# Patient Record
Sex: FEMALE | Race: BLACK OR AFRICAN AMERICAN | Employment: UNEMPLOYED | URBAN - METROPOLITAN AREA
[De-identification: names, ages, dates, MRNs, and addresses within clinical notes are randomized per-mention and may not be internally consistent; named-entity substitution may affect disease eponyms.]

---

## 2017-04-13 ENCOUNTER — PRENATAL OFFICE VISIT (OUTPATIENT)
Dept: NURSING | Facility: CLINIC | Age: 32
End: 2017-04-13
Payer: COMMERCIAL

## 2017-04-13 DIAGNOSIS — Z34.80 SUPERVISION OF OTHER NORMAL PREGNANCY, ANTEPARTUM: Primary | ICD-10-CM

## 2017-04-13 LAB
ABO + RH BLD: NORMAL
ABO + RH BLD: NORMAL
BETA HCG QUAL IFA URINE: POSITIVE
BLD GP AB SCN SERPL QL: NORMAL
BLOOD BANK CMNT PATIENT-IMP: NORMAL
ERYTHROCYTE [DISTWIDTH] IN BLOOD BY AUTOMATED COUNT: 15.4 % (ref 10–15)
HCT VFR BLD AUTO: 34.2 % (ref 35–47)
HGB BLD-MCNC: 11.5 G/DL (ref 11.7–15.7)
MCH RBC QN AUTO: 28.8 PG (ref 26.5–33)
MCHC RBC AUTO-ENTMCNC: 33.6 G/DL (ref 31.5–36.5)
MCV RBC AUTO: 86 FL (ref 78–100)
PLATELET # BLD AUTO: 152 10E9/L (ref 150–450)
RBC # BLD AUTO: 3.99 10E12/L (ref 3.8–5.2)
SPECIMEN EXP DATE BLD: NORMAL
WBC # BLD AUTO: 5.7 10E9/L (ref 4–11)

## 2017-04-13 PROCEDURE — 36415 COLL VENOUS BLD VENIPUNCTURE: CPT | Performed by: OBSTETRICS & GYNECOLOGY

## 2017-04-13 PROCEDURE — 86780 TREPONEMA PALLIDUM: CPT | Performed by: OBSTETRICS & GYNECOLOGY

## 2017-04-13 PROCEDURE — 87389 HIV-1 AG W/HIV-1&-2 AB AG IA: CPT | Performed by: OBSTETRICS & GYNECOLOGY

## 2017-04-13 PROCEDURE — 86762 RUBELLA ANTIBODY: CPT | Performed by: OBSTETRICS & GYNECOLOGY

## 2017-04-13 PROCEDURE — 86850 RBC ANTIBODY SCREEN: CPT | Performed by: OBSTETRICS & GYNECOLOGY

## 2017-04-13 PROCEDURE — 86901 BLOOD TYPING SEROLOGIC RH(D): CPT | Performed by: OBSTETRICS & GYNECOLOGY

## 2017-04-13 PROCEDURE — 86900 BLOOD TYPING SEROLOGIC ABO: CPT | Performed by: OBSTETRICS & GYNECOLOGY

## 2017-04-13 PROCEDURE — 85027 COMPLETE CBC AUTOMATED: CPT | Performed by: OBSTETRICS & GYNECOLOGY

## 2017-04-13 PROCEDURE — 87340 HEPATITIS B SURFACE AG IA: CPT | Performed by: OBSTETRICS & GYNECOLOGY

## 2017-04-13 PROCEDURE — 99207 ZZC NO CHARGE NURSE ONLY: CPT

## 2017-04-13 PROCEDURE — 84703 CHORIONIC GONADOTROPIN ASSAY: CPT | Performed by: OBSTETRICS & GYNECOLOGY

## 2017-04-13 PROCEDURE — 87086 URINE CULTURE/COLONY COUNT: CPT | Performed by: OBSTETRICS & GYNECOLOGY

## 2017-04-13 NOTE — NURSING NOTE
NPN nurse visit. 1st dr visit scheduled for 5/17/17 with Tiffanie Francis M.D. Pap due. Last pap unsure.  Pt did not list LMP on her questionnaires. I did place an order for an US.   LMP is noted as 12/6/16 in epic.?  TOMAS Jiménez RN

## 2017-04-14 LAB
BACTERIA SPEC CULT: NO GROWTH
HBV SURFACE AG SERPL QL IA: NONREACTIVE
HIV 1+2 AB+HIV1 P24 AG SERPL QL IA: NORMAL
MICRO REPORT STATUS: NORMAL
RUBV IGG SERPL IA-ACNC: 30 IU/ML
SPECIMEN SOURCE: NORMAL
T PALLIDUM IGG+IGM SER QL: NEGATIVE

## 2017-04-26 ENCOUNTER — RADIANT APPOINTMENT (OUTPATIENT)
Dept: ULTRASOUND IMAGING | Facility: CLINIC | Age: 32
End: 2017-04-26
Attending: OBSTETRICS & GYNECOLOGY
Payer: COMMERCIAL

## 2017-04-26 DIAGNOSIS — Z34.80 SUPERVISION OF OTHER NORMAL PREGNANCY, ANTEPARTUM: ICD-10-CM

## 2017-04-26 PROCEDURE — 76801 OB US < 14 WKS SINGLE FETUS: CPT | Performed by: OBSTETRICS & GYNECOLOGY

## 2017-10-04 LAB — GROUP B STREP PCR: NORMAL

## 2017-11-10 RX ORDER — SODIUM CHLORIDE, SODIUM LACTATE, POTASSIUM CHLORIDE, CALCIUM CHLORIDE 600; 310; 30; 20 MG/100ML; MG/100ML; MG/100ML; MG/100ML
INJECTION, SOLUTION INTRAVENOUS CONTINUOUS
Status: CANCELLED | OUTPATIENT
Start: 2017-11-10

## 2017-11-10 RX ORDER — OXYTOCIN/0.9 % SODIUM CHLORIDE 30/500 ML
1-24 PLASTIC BAG, INJECTION (ML) INTRAVENOUS CONTINUOUS
Status: CANCELLED | OUTPATIENT
Start: 2017-11-10

## 2017-11-10 RX ORDER — TERBUTALINE SULFATE 1 MG/ML
0.25 INJECTION, SOLUTION SUBCUTANEOUS
Status: CANCELLED | OUTPATIENT
Start: 2017-11-10

## 2017-11-10 RX ORDER — LIDOCAINE 40 MG/G
CREAM TOPICAL
Status: CANCELLED | OUTPATIENT
Start: 2017-11-10

## 2017-11-14 ENCOUNTER — HOSPITAL ENCOUNTER (INPATIENT)
Facility: CLINIC | Age: 32
LOS: 2 days | Discharge: HOME HEALTH | End: 2017-11-16
Attending: REGISTERED NURSE | Admitting: REGISTERED NURSE
Payer: COMMERCIAL

## 2017-11-14 LAB
ABO + RH BLD: NORMAL
ABO + RH BLD: NORMAL
SPECIMEN EXP DATE BLD: NORMAL
T PALLIDUM IGG+IGM SER QL: NEGATIVE

## 2017-11-14 PROCEDURE — 86780 TREPONEMA PALLIDUM: CPT | Performed by: REGISTERED NURSE

## 2017-11-14 PROCEDURE — 86900 BLOOD TYPING SEROLOGIC ABO: CPT | Performed by: REGISTERED NURSE

## 2017-11-14 PROCEDURE — 36415 COLL VENOUS BLD VENIPUNCTURE: CPT | Performed by: REGISTERED NURSE

## 2017-11-14 PROCEDURE — 12000037 ZZH R&B POSTPARTUM INTERMEDIATE

## 2017-11-14 PROCEDURE — 25000125 ZZHC RX 250: Performed by: REGISTERED NURSE

## 2017-11-14 PROCEDURE — S0191 MISOPROSTOL, ORAL, 200 MCG: HCPCS | Performed by: REGISTERED NURSE

## 2017-11-14 PROCEDURE — 72200001 ZZH LABOR CARE VAGINAL DELIVERY SINGLE

## 2017-11-14 PROCEDURE — 25000128 H RX IP 250 OP 636: Performed by: REGISTERED NURSE

## 2017-11-14 PROCEDURE — 25000132 ZZH RX MED GY IP 250 OP 250 PS 637: Performed by: REGISTERED NURSE

## 2017-11-14 PROCEDURE — 10907ZC DRAINAGE OF AMNIOTIC FLUID, THERAPEUTIC FROM PRODUCTS OF CONCEPTION, VIA NATURAL OR ARTIFICIAL OPENING: ICD-10-PCS | Performed by: REGISTERED NURSE

## 2017-11-14 PROCEDURE — 86901 BLOOD TYPING SEROLOGIC RH(D): CPT | Performed by: REGISTERED NURSE

## 2017-11-14 PROCEDURE — 3E033VJ INTRODUCTION OF OTHER HORMONE INTO PERIPHERAL VEIN, PERCUTANEOUS APPROACH: ICD-10-PCS | Performed by: REGISTERED NURSE

## 2017-11-14 RX ORDER — OXYTOCIN/0.9 % SODIUM CHLORIDE 30/500 ML
340 PLASTIC BAG, INJECTION (ML) INTRAVENOUS CONTINUOUS PRN
Status: DISCONTINUED | OUTPATIENT
Start: 2017-11-14 | End: 2017-11-16 | Stop reason: HOSPADM

## 2017-11-14 RX ORDER — OXYTOCIN 10 [USP'U]/ML
10 INJECTION, SOLUTION INTRAMUSCULAR; INTRAVENOUS
Status: DISCONTINUED | OUTPATIENT
Start: 2017-11-14 | End: 2017-11-16 | Stop reason: HOSPADM

## 2017-11-14 RX ORDER — ONDANSETRON 2 MG/ML
4 INJECTION INTRAMUSCULAR; INTRAVENOUS EVERY 6 HOURS PRN
Status: DISCONTINUED | OUTPATIENT
Start: 2017-11-14 | End: 2017-11-14

## 2017-11-14 RX ORDER — NALOXONE HYDROCHLORIDE 0.4 MG/ML
.1-.4 INJECTION, SOLUTION INTRAMUSCULAR; INTRAVENOUS; SUBCUTANEOUS
Status: DISCONTINUED | OUTPATIENT
Start: 2017-11-14 | End: 2017-11-14

## 2017-11-14 RX ORDER — OXYTOCIN/0.9 % SODIUM CHLORIDE 30/500 ML
1-24 PLASTIC BAG, INJECTION (ML) INTRAVENOUS CONTINUOUS
Status: DISCONTINUED | OUTPATIENT
Start: 2017-11-14 | End: 2017-11-14

## 2017-11-14 RX ORDER — NALOXONE HYDROCHLORIDE 0.4 MG/ML
.1-.4 INJECTION, SOLUTION INTRAMUSCULAR; INTRAVENOUS; SUBCUTANEOUS
Status: DISCONTINUED | OUTPATIENT
Start: 2017-11-14 | End: 2017-11-16 | Stop reason: HOSPADM

## 2017-11-14 RX ORDER — ACETAMINOPHEN 325 MG/1
650 TABLET ORAL EVERY 4 HOURS PRN
Status: DISCONTINUED | OUTPATIENT
Start: 2017-11-14 | End: 2017-11-16 | Stop reason: HOSPADM

## 2017-11-14 RX ORDER — CARBOPROST TROMETHAMINE 250 UG/ML
250 INJECTION, SOLUTION INTRAMUSCULAR
Status: DISCONTINUED | OUTPATIENT
Start: 2017-11-14 | End: 2017-11-14

## 2017-11-14 RX ORDER — LANOLIN 100 %
OINTMENT (GRAM) TOPICAL
Status: DISCONTINUED | OUTPATIENT
Start: 2017-11-14 | End: 2017-11-16 | Stop reason: HOSPADM

## 2017-11-14 RX ORDER — LIDOCAINE 40 MG/G
CREAM TOPICAL
Status: DISCONTINUED | OUTPATIENT
Start: 2017-11-14 | End: 2017-11-14

## 2017-11-14 RX ORDER — AMOXICILLIN 250 MG
1-2 CAPSULE ORAL 2 TIMES DAILY
Status: DISCONTINUED | OUTPATIENT
Start: 2017-11-14 | End: 2017-11-16 | Stop reason: HOSPADM

## 2017-11-14 RX ORDER — FENTANYL CITRATE 50 UG/ML
50-100 INJECTION, SOLUTION INTRAMUSCULAR; INTRAVENOUS
Status: DISCONTINUED | OUTPATIENT
Start: 2017-11-14 | End: 2017-11-14

## 2017-11-14 RX ORDER — MISOPROSTOL 200 UG/1
800 TABLET ORAL ONCE
Status: COMPLETED | OUTPATIENT
Start: 2017-11-14 | End: 2017-11-14

## 2017-11-14 RX ORDER — OXYTOCIN/0.9 % SODIUM CHLORIDE 30/500 ML
100-340 PLASTIC BAG, INJECTION (ML) INTRAVENOUS CONTINUOUS PRN
Status: COMPLETED | OUTPATIENT
Start: 2017-11-14 | End: 2017-11-14

## 2017-11-14 RX ORDER — IBUPROFEN 400 MG/1
800 TABLET, FILM COATED ORAL
Status: COMPLETED | OUTPATIENT
Start: 2017-11-14 | End: 2017-11-14

## 2017-11-14 RX ORDER — BISACODYL 10 MG
10 SUPPOSITORY, RECTAL RECTAL DAILY PRN
Status: DISCONTINUED | OUTPATIENT
Start: 2017-11-16 | End: 2017-11-16 | Stop reason: HOSPADM

## 2017-11-14 RX ORDER — PENICILLIN G POTASSIUM 5000000 [IU]/1
2.5 INJECTION, POWDER, FOR SOLUTION INTRAMUSCULAR; INTRAVENOUS EVERY 4 HOURS
Status: DISCONTINUED | OUTPATIENT
Start: 2017-11-14 | End: 2017-11-14

## 2017-11-14 RX ORDER — OXYTOCIN/0.9 % SODIUM CHLORIDE 30/500 ML
100 PLASTIC BAG, INJECTION (ML) INTRAVENOUS CONTINUOUS
Status: DISCONTINUED | OUTPATIENT
Start: 2017-11-14 | End: 2017-11-16 | Stop reason: HOSPADM

## 2017-11-14 RX ORDER — OXYCODONE AND ACETAMINOPHEN 5; 325 MG/1; MG/1
1 TABLET ORAL
Status: DISCONTINUED | OUTPATIENT
Start: 2017-11-14 | End: 2017-11-14

## 2017-11-14 RX ORDER — PENICILLIN G POTASSIUM 5000000 [IU]/1
5 INJECTION, POWDER, FOR SOLUTION INTRAMUSCULAR; INTRAVENOUS ONCE
Status: COMPLETED | OUTPATIENT
Start: 2017-11-14 | End: 2017-11-14

## 2017-11-14 RX ORDER — SODIUM CHLORIDE, SODIUM LACTATE, POTASSIUM CHLORIDE, CALCIUM CHLORIDE 600; 310; 30; 20 MG/100ML; MG/100ML; MG/100ML; MG/100ML
INJECTION, SOLUTION INTRAVENOUS CONTINUOUS
Status: DISCONTINUED | OUTPATIENT
Start: 2017-11-14 | End: 2017-11-14

## 2017-11-14 RX ORDER — METHYLERGONOVINE MALEATE 0.2 MG/ML
200 INJECTION INTRAVENOUS
Status: DISCONTINUED | OUTPATIENT
Start: 2017-11-14 | End: 2017-11-14

## 2017-11-14 RX ORDER — MISOPROSTOL 200 UG/1
400 TABLET ORAL
Status: DISCONTINUED | OUTPATIENT
Start: 2017-11-14 | End: 2017-11-16 | Stop reason: HOSPADM

## 2017-11-14 RX ORDER — IBUPROFEN 400 MG/1
400-800 TABLET, FILM COATED ORAL EVERY 6 HOURS PRN
Status: DISCONTINUED | OUTPATIENT
Start: 2017-11-14 | End: 2017-11-16 | Stop reason: HOSPADM

## 2017-11-14 RX ORDER — HYDROCORTISONE 2.5 %
CREAM (GRAM) TOPICAL 3 TIMES DAILY PRN
Status: DISCONTINUED | OUTPATIENT
Start: 2017-11-14 | End: 2017-11-16 | Stop reason: HOSPADM

## 2017-11-14 RX ORDER — ACETAMINOPHEN 325 MG/1
650 TABLET ORAL EVERY 4 HOURS PRN
Status: DISCONTINUED | OUTPATIENT
Start: 2017-11-14 | End: 2017-11-14

## 2017-11-14 RX ORDER — PNV NO.95/FERROUS FUM/FOLIC AC 28MG-0.8MG
1 TABLET ORAL DAILY
Status: ON HOLD | COMMUNITY
Start: 2017-05-10 | End: 2021-08-13

## 2017-11-14 RX ORDER — OXYTOCIN 10 [USP'U]/ML
10 INJECTION, SOLUTION INTRAMUSCULAR; INTRAVENOUS
Status: DISCONTINUED | OUTPATIENT
Start: 2017-11-14 | End: 2017-11-14

## 2017-11-14 RX ADMIN — SODIUM CHLORIDE 2.5 MILLION UNITS: 9 INJECTION, SOLUTION INTRAVENOUS at 16:55

## 2017-11-14 RX ADMIN — SODIUM CHLORIDE, POTASSIUM CHLORIDE, SODIUM LACTATE AND CALCIUM CHLORIDE: 600; 310; 30; 20 INJECTION, SOLUTION INTRAVENOUS at 09:02

## 2017-11-14 RX ADMIN — IBUPROFEN 800 MG: 400 TABLET ORAL at 20:17

## 2017-11-14 RX ADMIN — PENICILLIN G POTASSIUM 5 MILLION UNITS: 5000000 POWDER, FOR SOLUTION INTRAMUSCULAR; INTRAPLEURAL; INTRATHECAL; INTRAVENOUS at 09:03

## 2017-11-14 RX ADMIN — Medication 100 ML/HR: at 20:15

## 2017-11-14 RX ADMIN — LIDOCAINE HYDROCHLORIDE 20 ML: 10 INJECTION, SOLUTION INFILTRATION; PERINEURAL at 19:50

## 2017-11-14 RX ADMIN — FENTANYL CITRATE 100 MCG: 50 INJECTION INTRAMUSCULAR; INTRAVENOUS at 17:01

## 2017-11-14 RX ADMIN — OXYTOCIN-SODIUM CHLORIDE 0.9% IV SOLN 30 UNIT/500ML 1 MILLI-UNITS/MIN: 30-0.9/5 SOLUTION at 11:23

## 2017-11-14 RX ADMIN — OXYTOCIN-SODIUM CHLORIDE 0.9% IV SOLN 30 UNIT/500ML 340 ML/HR: 30-0.9/5 SOLUTION at 19:45

## 2017-11-14 RX ADMIN — FENTANYL CITRATE 100 MCG: 50 INJECTION INTRAMUSCULAR; INTRAVENOUS at 18:17

## 2017-11-14 RX ADMIN — SODIUM CHLORIDE 2.5 MILLION UNITS: 9 INJECTION, SOLUTION INTRAVENOUS at 13:11

## 2017-11-14 RX ADMIN — ACETAMINOPHEN 650 MG: 325 TABLET, FILM COATED ORAL at 20:17

## 2017-11-14 RX ADMIN — MISOPROSTOL 800 MCG: 200 TABLET ORAL at 19:52

## 2017-11-14 NOTE — H&P
[facility] Labor and Delivery History and Physical    Daria Jaffe MRN# [id number]   Age: 32 year old YOB: 1985     Date of Admission:  2017    Primary care provider: No Ref-Primary, Physician           Chief Complaint:   Daria Jaffe is a 32 year old  who is 42w1d pregnant and being admitted for induction of labor, indication post-dates. Pt had a C/S with her second delivery. She has had two pitocin IOL since C/S with successful . Pregnancy course has been unremarkable.           Pregnancy history:     OBSTETRIC HISTORY:  x 1 with severe tearing in her first pregnancy. Decision was made to do primary elective C/S with next delivery because of this. With next provider Decision was made to attempt TOLAC. She has had 2  since C/S. Both were Pitocin IOL.   CURRENT PREGNANCY: GBS+, rubella non-immune. She has had severe anemia in the past but this pregnancy it has been mild. Postdates. US done at 13.5 weeks was consistent with LMP.     Obstetric History       T4      L6     SAB0   TAB0   Ectopic0   Multiple0   Live Births4       # Outcome Date GA Lbr Richard/2nd Weight Sex Delivery Anes PTL Lv   5 Current            4 Term 14 41w2d 03:08 / 00:04 3.935 kg (8 lb 10.8 oz) M  None N FRANKI      Name: Muba      Apgar1:  8                Apgar5: 9   3 Term 08/10/12 40w1d 05:20 / 00:08 3.629 kg (8 lb) M  Local N FRANKI      Name: Cecilia Med      Apgar1:  9                Apgar5: 9   2 Term 11 37w0d    -SEC   FRANKI      Name: Cyndee   1 Term 04/27/10 41w0d   F    FRANKI      Name: Barrington Basilio          EDC: Estimated Date of Delivery: Oct 31, 2017    Prenatal Labs:   Lab Results   Component Value Date    ABO O 2017    RH  Pos 2017    AS Neg 2017    HEPBANG Nonreactive 2017    CHPCRT negative 2014    TREPAB Negative 2017    RUBELLAABIGG immune 2014    HGB 11.5 (L) 2017    HIV neg 2012       GBS Status:    Lab Results   Component Value Date    GBS positive        Active Problem List  Patient Active Problem List   Diagnosis          Labor and delivery indication for care or intervention     Previous  delivery, antepartum condition or complication     Post-dates pregnancy         Previous  delivery, antepartum condition or complication     Partial Thyroidectomy. Stable labs.      Anemia very mild this pregnancy     Supervision of other normal pregnancy, antepartum       Medication Prior to Admission  No prescriptions prior to admission.   .        Maternal Past Medical History:     Past Medical History:   Diagnosis Date     Anemia     needed 2 units of blood for PPH due to severe tearing in first pregnancy.      Previous C/S     l     Thyroid disease     Partial thyroidectomy                          Social History:   I have reviewed this patient's social history          Physical Exam:   Vitals were reviewed  Blood pressure 115/81, temperature 97.5  F (36.4  C), temperature source Temporal, last menstrual period 2016, currently breastfeeding.  Constitutional:   awake, alert, cooperative, no apparent distress, and appears stated age        Cervix:   Membranes: AROM   Dilation: 2   Effacement: 70%   Station:-2   Consistency: soft   Position: Posterior  Presentation:Cephalic  Fetal Heart Rate Tracing: reactive and reassuring  Tocometer: Uterine irritability.                        Assessment:   Daria Jaffe is a 42w1d pregnant female admitted with induction of labor, indication post-dates.          Plan:   AROM performed by Dr. Garcia. Plan is to wait to see if labor starts without use of Pitocin. Will consider Pitocin at noon if there are no ctxs.   Admit - see IP orders  IVY Ham CNM

## 2017-11-14 NOTE — PROGRESS NOTES
Plan of care, TOLAC,  reviewed with Elisha Irvin CNM and I agree with plan. Pt examined. Cx 2/70/very soft/posterior/vtx -2 and well applied. AROM performed by me this check. Moderate amount of clear fluid. Pt kellen irregularly. FHR category 1.     Continue care per Elisha VANEGAS.   Pitocin augmentation prn  IV access in. Labs done  Either myself or Dr. Palmer will be immediately available today if needed

## 2017-11-14 NOTE — IP AVS SNAPSHOT
MRN:[id number]                      After Visit Summary   11/14/2017    Daria Jaffe    MRN: [id number]           Thank you!     Thank you for choosing [facility] for your care. Our goal is always to provide you with excellent care. Hearing back from our patients is one way we can continue to improve our services. Please take a few minutes to complete the written survey that you may receive in the mail after you visit with us. Thank you!        Patient Information     Date Of Birth          1985        About your hospital stay     You were admitted on:  November 14, 2017 You last received care in the:  [address]    You were discharged on:  November 16, 2017        Reason for your hospital stay       Maternity care                  Who to Call     For medical emergencies, please call 911.  For non-urgent questions about your medical care, please call your primary care provider or clinic, None          Attending Provider     Provider Specialty    Elisha Irvin APRN CNM [facility]       Primary Care Provider    Physician No Ref-Primary      After Care Instructions     Activity       Review discharge instructions            Diet       Resume previous diet            Discharge Instructions - Hypertensive disorders patients       High Blood pressure patients to follow up in clinic or via home care within one week for a blood pressure check            Discharge Instructions - Postpartum visit       Schedule postpartum visit with your provider and return to clinic in 6 weeks.                  Follow-up Appointments     Follow Up and recommended labs and tests       F/u in office in 6 weeks for PP visit.                  Further instructions from your care team       Postpartum Vaginal Delivery Instructions    Activity       Ask family and friends for help when you need it.    Do not place anything in your vagina for 6 weeks.    You are not restricted on other  activities, but take it easy for a few weeks to allow your body to recover from delivery.  You are able to do any activities you feel up to that point.    No driving until you have stopped taking your pain medications (usually two weeks after delivery).     Call your health care provider if you have any of these symptoms:       Increased pain, swelling, redness, or fluid around your stiches from an episiotomy or perineal tear.    A fever above 100.4 F (38 C) with or without chills when placing a thermometer under your tongue.    You soak a sanitary pad with blood within 1 hour, or you see blood clots larger than a golf ball.    Bleeding that lasts more than 6 weeks.    Vaginal discharge that smells bad.    Severe pain, cramping or tenderness in your lower belly area.    A need to urinate more frequently (use the toilet more often), more urgently (use the toilet very quickly), or it burns when you urinate.    Nausea and vomiting.    Redness, swelling or pain around a vein in your leg.    Problems breastfeeding or a red or painful area on your breast.    Chest pain and cough or are gasping for air.    Problems coping with sadness, anxiety, or depression.  If you have any concerns about hurting yourself or the baby, call your provider immediately.     You have questions or concerns after you return home.     Keep your hands clean:  Always wash your hands before touching your perineal area and stitches.  This helps reduce your risk of infection.  If your hands aren't dirty, you may use an alcohol hand-rub to clean your hands. Keep your nails clean and short.        Pending Results     No orders found from 11/12/2017 to 11/15/2017.            Statement of Approval     Ordered          11/16/17 0805  I have reviewed and agree with all the recommendations and orders detailed in this document.  EFFECTIVE NOW     Approved and electronically signed by:  Elisha Irvin APRN CNM             Admission Information      "Date & Time Provider Department Dept. Phone    2017 [place], Elisha Borjahleen, IVY VANEGAS [address] [phone]      Your Vitals Were     Blood Pressure Pulse Temperature Respirations Last Period       108/72 64 98.8  F (37.1  C) (Oral) 16 2016 (Exact Date)       MyChart Information     [employer] lets you send messages to your doctor, view your test results, renew your prescriptions, schedule appointments and more. To sign up, go to [email or web address] . Click on \"Log in\" on the left side of the screen, which will take you to the Welcome page. Then click on \"Sign up Now\" on the right side of the page.     You will be asked to enter the access code listed below, as well as some personal information. Please follow the directions to create your username and password.     Your access code is: [email or web address]  Expires: 2018 12:39 PM     Your access code will  in 90 days. If you need help or a new code, please call your [facility] clinic or [phone].        Care EveryWhere ID     This is your Care EveryWhere ID. This could be used by other organizations to access your [facility] medical records  [id number]        Equal Access to Services     MATTHEW HOWARD AH: Hadii angi Alcantar, waaxda luqadaha, qaybta kaalmada adeegyada, asia menchaca . So [facility] [place] [phone].    ATENCIÓN: Si habla español, tiene a wadsworth disposición servicios gratuitos de asistencia lingüística. Llame al [phone].    We comply with applicable federal civil rights laws and [place] laws. We do not discriminate on the basis of race, color, national origin, age, disability, sex, sexual orientation, or gender identity.               Review of your medicines      START taking        Dose / Directions    acetaminophen 325 MG tablet   Commonly known as:  TYLENOL   Used for:  Normal labor and delivery        Dose:  650 mg   Take 2 tablets (650 mg) by mouth every 4 hours as " needed for mild pain or fever (greater than or equal to 38? C /100.4? F (oral) or 38.5? C/ 101.4? F (core).)   Quantity:  100 tablet   Refills:  0       ibuprofen 200 MG tablet   Commonly known as:  ADVIL/MOTRIN   Used for:  Normal labor and delivery        Dose:  600 mg   Take 3 tablets (600 mg) by mouth every 6 hours as needed for other (cramping)   Refills:  0         CONTINUE these medicines which have NOT CHANGED        Dose / Directions    Prenatal Vitamins 28-0.8 MG Tabs        Dose:  1 tablet   Take 1 tablet by mouth daily   Refills:  0            Where to get your medicines      Some of these will need a paper prescription and others can be bought over the counter. Ask your nurse if you have questions.     You don't need a prescription for these medications     acetaminophen 325 MG tablet    ibuprofen 200 MG tablet                Protect others around you: Learn how to safely use, store and throw away your medicines at www.disposemymeds.org.             Medication List: This is a list of all your medications and when to take them. Check marks below indicate your daily home schedule. Keep this list as a reference.      Medications           Morning Afternoon Evening Bedtime As Needed    acetaminophen 325 MG tablet   Commonly known as:  TYLENOL   Take 2 tablets (650 mg) by mouth every 4 hours as needed for mild pain or fever (greater than or equal to 38? C /100.4? F (oral) or 38.5? C/ 101.4? F (core).)   Last time this was given:  650 mg on 11/15/2017  8:01 PM                                ibuprofen 200 MG tablet   Commonly known as:  ADVIL/MOTRIN   Take 3 tablets (600 mg) by mouth every 6 hours as needed for other (cramping)   Last time this was given:  800 mg on 11/15/2017  8:01 PM                                Prenatal Vitamins 28-0.8 MG Tabs   Take 1 tablet by mouth daily

## 2017-11-14 NOTE — PLAN OF CARE
Problem: Labor (Cervical Ripen, Induct, Augment) (Adult,Obstetrics,Pediatric)  Goal: Signs and Symptoms of Listed Potential Problems Will be Absent, Minimized or Managed (Labor)  Signs and symptoms of listed potential problems will be absent, minimized or managed by discharge/transition of care (reference Labor (Cervical Ripen, Induct, Augment) (Adult,Obstetrics,Pediatric) CPG).   Outcome: Therapy, progress toward functional goals as expected  0830-Daria is a 31yo  42w1d presents for induction of labor for postdates. Hx of C/S with 2nd baby. Plans to have unmedicated birth. GBS positive. NKDA. Denies problems with this pregnancy. Accompanied by her .   0855-AROM clear fluid. 2cm/70%/-2 soft per Elisha PAULINO CNM. Plan to observe cxns.   0945-Pt to birthing ball.  1035-Pt up to bathroom to void. States she feels cxns q 5 mins.   1123-Pitocin started.  1330-Per ROMINA Tello Increase pitocin 2mu every 30 mins until adequate labor. Will try to order pitocin with lidocaine so it doesn't burn in IV. Pt notified. Ambulating in room  1354-Pt changing positions between birthing ball and ambulating in room. Monitor is tracing contractions. Palpating moderate. States they aren't uncomfortable. States she had to be on 18mu of pitocin with last baby.   -Report given to SELINA Miramontes CNM at bedside.

## 2017-11-14 NOTE — IP AVS SNAPSHOT
[address]    [place] [address]    Phone:  [phone]                                       After Visit Summary   11/14/2017    Daria Jaffe    MRN: [id number]           After Visit Summary Signature Page     I have received my discharge instructions, and my questions have been answered. I have discussed any challenges I see with this plan with the nurse or doctor.    ..........................................................................................................................................  Patient/Patient Representative Signature      ..........................................................................................................................................  Patient Representative Print Name and Relationship to Patient    ..................................................               ................................................  Date                                            Time    ..........................................................................................................................................  Reviewed by Signature/Title    ...................................................              ..............................................  Date                                                            Time

## 2017-11-14 NOTE — PROGRESS NOTES
OB Progress Note  2017  4:15 PM    S:  Pt wincing with contractions but coping well. No other complaints.      O:  /64  Temp 97.2  F (36.2  C) (Temporal)  Resp 16  LMP 2016 (Exact Date)  EFM:  Category 1  [place]:  Ctx q2-6 min  SVE:  4.5/85%/-2  Membranes: clear fluid    Pitocin at 12 mU/min    A/P:  32 year old  @42w1d admitted for IOL for postdates. TOLAC  1.  Routine cares  2.  Continue Pitocin induction. Dr. Garcia and Dr. Palmer informed of pt progress.   3.  Anticipate     Elisha Irvin

## 2017-11-14 NOTE — PROGRESS NOTES
OB Progress Note  2017  11:14 AM    S:  Pt feeling occasional painful ctx.  No other complaints.      O:  /59  Temp 97.9  F (36.6  C) (Temporal)  LMP 2016 (Exact Date)  EFM: Category 1  [place]:  Ctx occasional  SVE:  2/70%/-2  Membranes: Clear fluid seen with exam.         A/P:  32 year old  @42w1d admitted for IOL for Postdates. TOLAC.  x 2.   1.  Routine cares  2.  No change to cervix and ctxs have not picked up. Starting low dose Pitocin.   3.  Anticipate     Elisha Irvin

## 2017-11-14 NOTE — PROGRESS NOTES
OB Progress Note  2017  4:58 PM    S:  Pt coping well with ctxs. Asking for fentanyl.     O:  /64  Temp 97.2  F (36.2  C) (Temporal)  Resp 16  LMP 2016 (Exact Date)  EFM: Category 1  [place]:  Ctx q2-4 min  SVE:  5/85%/-1  Membranes: clear fluid    Pitocin at 12 mU/min    A/P:  32 year old  @42w1d admitted for IOl for postdates, TOLAC  1.  Routine cares  2. Giving IV Fentanyl.   3.  Anticipate     Elisha Irvin

## 2017-11-15 LAB — HGB BLD-MCNC: 11 G/DL (ref 11.7–15.7)

## 2017-11-15 PROCEDURE — 85018 HEMOGLOBIN: CPT | Performed by: REGISTERED NURSE

## 2017-11-15 PROCEDURE — 25000132 ZZH RX MED GY IP 250 OP 250 PS 637: Performed by: REGISTERED NURSE

## 2017-11-15 PROCEDURE — 25000132 ZZH RX MED GY IP 250 OP 250 PS 637: Performed by: MIDWIFE

## 2017-11-15 PROCEDURE — 12000037 ZZH R&B POSTPARTUM INTERMEDIATE

## 2017-11-15 PROCEDURE — 36415 COLL VENOUS BLD VENIPUNCTURE: CPT | Performed by: REGISTERED NURSE

## 2017-11-15 RX ORDER — OXYCODONE HYDROCHLORIDE 5 MG/1
5 TABLET ORAL EVERY 4 HOURS PRN
Status: DISCONTINUED | OUTPATIENT
Start: 2017-11-15 | End: 2017-11-16 | Stop reason: HOSPADM

## 2017-11-15 RX ADMIN — IBUPROFEN 800 MG: 400 TABLET ORAL at 14:05

## 2017-11-15 RX ADMIN — ACETAMINOPHEN 650 MG: 325 TABLET, FILM COATED ORAL at 14:05

## 2017-11-15 RX ADMIN — ACETAMINOPHEN 650 MG: 325 TABLET, FILM COATED ORAL at 02:05

## 2017-11-15 RX ADMIN — IBUPROFEN 800 MG: 400 TABLET ORAL at 02:05

## 2017-11-15 RX ADMIN — IBUPROFEN 800 MG: 400 TABLET ORAL at 20:01

## 2017-11-15 RX ADMIN — MAGNESIUM HYDROXIDE 30 ML: 400 SUSPENSION ORAL at 10:51

## 2017-11-15 RX ADMIN — IBUPROFEN 800 MG: 400 TABLET ORAL at 07:57

## 2017-11-15 RX ADMIN — ACETAMINOPHEN 650 MG: 325 TABLET, FILM COATED ORAL at 20:01

## 2017-11-15 RX ADMIN — ACETAMINOPHEN 650 MG: 325 TABLET, FILM COATED ORAL at 07:57

## 2017-11-15 NOTE — PROGRESS NOTES
OB Progress Note  2017  7:29 PM    S:  Pt is working hard with painful ctxs.       O:  /70  Temp 97.9  F (36.6  C) (Temporal)  Resp 16  LMP 2016 (Exact Date)  EFM:  Category 1  [place]:  Ctx q3 min  SVE:  8/95%/0  Membranes: clear fluid    Pitocin at 6 mU/min    A/P:  32 year old  @42w1d admitted IOL for postdates, TOLAC  1.  Routine cares    3.  Anticipate     Elisha Irvin

## 2017-11-15 NOTE — PLAN OF CARE
Problem: Patient Care Overview  Goal: Plan of Care/Patient Progress Review  Outcome: Improving  AVSS.  Tylenol and Ibuprofen controlling pain well. Bonding well with infant.

## 2017-11-15 NOTE — PLAN OF CARE
Data: Daria Jaffe transferred to 414 via wheelchair at 2155. Baby transferred via parent's arms.  Action: Receiving unit notified of transfer: Yes. Patient and family notified of room change. Report given to Jayashree BECKETT RN at 2155. Belongings sent to receiving unit. Accompanied by Registered Nurse. Oriented patient to surroundings. Call light within reach. ID bands double-checked with receiving RN.  Response: Patient tolerated transfer and is stable.

## 2017-11-15 NOTE — PROGRESS NOTES
OB Post-partum Note  PPD# 1    S:  Patient doing well.  Reports cramping pain 6/10 and hemorrhoids are painful  Voiding.  Bleeding scant.  Breast feeding and bottle feeding. Concerned about having a bowel movement with hemorrhoids and perineal tear, states stool softeners do not work would like MOM ordered and something stronger for pain    O:  /80  Pulse 58  Temp 98.2  F (36.8  C) (Oral)  Resp 16  LMP 2016 (Exact Date)  Breastfeeding? Unknown  Gen- A&O, NAD  Abd- Non-tender, fundus firm at umbilicus  Ext- non-tender, neg edema    Hemoglobin   Date Value Ref Range Status   11/15/2017 11.0 (L) 11.7 - 15.7 g/dL Final     O+  Rubella Immune    A/P: 32 year old  PPD# 1 s/p     1.  Routine post-partum cares.  2.  Anticipate d/c home on PPD# 2.    Trista Prasad CNM  11/15/2017  8:29 AM

## 2017-11-15 NOTE — PLAN OF CARE
Problem: Patient Care Overview  Goal: Plan of Care/Patient Progress Review  Outcome: Improving  Pt. admitted from L&D  via wheelchair and transferred to bed with RN. Pt. arrived with baby and was accompanied by  and arrived with personal belongings. Report was taken from Yadira in L&D. VSS. Fundus is firm and midline.  Vaginal bleeding is small.  SL in place and pitocin running.  Pt. oriented to the room and call light system. Pt requesting bottle and formula.  Encouraged to call with questions or concerns.  Will continue to monitor.

## 2017-11-15 NOTE — PLAN OF CARE
Problem: Labor (Cervical Ripen, Induct, Augment) (Adult,Obstetrics,Pediatric)  Goal: Signs and Symptoms of Listed Potential Problems Will be Absent, Minimized or Managed (Labor)  Signs and symptoms of listed potential problems will be absent, minimized or managed by discharge/transition of care (reference Labor (Cervical Ripen, Induct, Augment) (Adult,Obstetrics,Pediatric) CPG).   Outcome: Completed Date Met:  11/14/17  Delivery of viable female infant. Apgars 9 & 9. See delivery summary for further details.

## 2017-11-15 NOTE — PLAN OF CARE
Problem: Patient Care Overview  Goal: Plan of Care/Patient Progress Review  Outcome: No Change  The pt reports cramping pain 6/10, she was given pain meds, encouraged to keep her bladder empty, and was given an aqua k heating pad.  Will continue to monitor.  She sent her daughter to the nursery overnight so she could rest

## 2017-11-15 NOTE — LACTATION NOTE
Initial Lactation visit. Hand out given. Recommend unlimited, frequent breast feedings: At least 8 - 12 times every 24 hours. Avoid pacifiers and supplementation with formula unless medically indicated. Explained benefits of holding baby skin on skin to help promote better breastfeeding outcomes. Infant has been breast feeding then bottle feeding formula at Daria's preference.  Encouraged her to breast feed before bottle feeding.  Reviewed appropriate supplement volumes and importance of breast feeding to establish a good supply.  Will revisit as needed.    Danni Hilliard RN, IBCLC

## 2017-11-15 NOTE — L&D DELIVERY NOTE
Delivery Summary    Daria Jaffe MRN# [id number]   Age: 32 year old YOB: 1985     FIRST STAGE: Daria Jaffe is a 32 year old G5, now  who was 42w0d pregnant today and admitted for induction of labor, indication post-dates. Pt had a C/S with her second delivery. She has had two pitocin IOL since C/S with successful . Pregnancy course was been unremarkable.  Pt was admitted this morning and Dr. Garcia assessed for AROM. AROM was performed at 0855. Pt was 2/70/-2 at the time. She did not progress after 2.5 hours so Pitocin was started slowly initially and then per protocol. Pt received 2 doses of Fentanyl, 100mcg at 5.5 cm and 100mcg an hour later at 6.5 cm. Pt was complete at 1933. Category 1 tracing throughout first stage.     SECOND STAGE: At 0738 on 2017, / of vigorous female infant in vertex presentation over midline episiotomy, Apgars 9&9, 8#7oz. Baby laid on ,maternal abdomen.  Cord clamped at cut after 1 minute.     THIRD STAGE: Placenta delivered spontaneously and intact at 0745, 3 vessel cord. Brisk bleeding after delivery, Pitocin running and 800mcg of Cytotec placed rectally. Red florence used to drain bladder. Interventions produced good results. Midline Episiotomy with second degree extension repaired with 3.0 Chromic x 1.  EBL 500ml.       ASSESSMENT & PLAN:       Postpartum care per protocol.         Labor Event Times    Labor onset date:  17 Onset time:   4:05 PM   Dilation complete date:  17 Complete time:   7:33 PM   Start pushing date/time:  2017 1933            Labor Events     labor?:  No    steroids:  None   Labor Type:  Induction   Predominate monitoring during 1st stage:  continuous electronic fetal monitoring      Antibiotics received during labor?:  Yes      Rupture identifier:  Rupture 1   Rupture date/time: 17 0855   Rupture type:  Artificial Rupture of Membranes   Fluid color:  Clear   Fluid odor:  Normal     "  Induction:  AROM, Oxytocin   Induction date/time:     Cervical ripening date/time:     Indications for induction:  Post-term Gestation      Delivery/Placenta Date and Time    Delivery Date:  17 Delivery Time:   7:38 PM   Placenta Date/Time:  2017  7:45 PM   Oxytocin given at the time of delivery:  after delivery of placenta      Vaginal Counts    Initial count performed by 2 team members:   Two Team Members   K.Malm, Rn Jana Flescher, CNM          Needles Suture [place] Sponges Instruments   Initial counts 2 0 10    Added to count 0 1 0    Final counts 2 1 10       Placed during labor Accounted for at the end of labor   No NA   No NA   No NA      Final count performed by 2 team members:   Two Team Members   Priscilla Hernández CNM         Final count correct?:  Yes         Apgars    Living status:  Living    1 Minute 5 Minute 10 Minute 15 Minute 20 Minute   Skin color:        Heart rate:        Reflex irritability:        Muscle tone:        Respiratory effort:        Total:           Apgars assigned by:  DEBI JO RN      Cord    Vessels:  3 Vessels Complications:  None   Cord Blood Disposition:  Lab Gases Sent?:  No         [place] Resuscitation    Methods:  None         [place] Measurements    Weight:  8 lb 6.8 oz Length:  1' 10\"   Head circumference:  35.6 cm       Labor Events and Shoulder Dystocia    Fetal Tracing Prior to Delivery:  Category 1   Shoulder dystocia present?:  Neg            Delivery (Maternal) (Provider to Complete) ([id number])    Episiotomy:  Median   Indications for episiotomy:  Female Genital Cutting   Perineal lacerations:  2nd Repaired?:  Yes   Number of repair packets:  1         Mother's Information  Mother: Daria Jaffe #[id number]    Start of Mother's Information     IO Blood Loss  17 1605 - 17    Mom's I/O Activity            End of Mother's Information  Mother: Daria Jaffe #[id number]            Delivery - Provider to Complete ([id number])    Delivering " clinician:  JUANCARLOS WILDER   CNVANDA Care:  Any CNM care in labor   Attempted Delivery Types (Choose all that apply):  Vaginal Birth After    Delivery Type (Choose the 1 that will go to the Birth History):  , Spontaneous                           Placenta    Delayed Cord Clamping:  Done   Date/Time:  2017  7:45 PM   Removal:  Spontaneous   Disposition:  Hospital disposal      Anesthesia    Method:  Local, INTRAVENOUS REGIONAL         Presentation and Position    Presentation:  Vertex   Position:  Left Occiput Anterior                    IVY Ham CNM

## 2017-11-15 NOTE — PLAN OF CARE
Problem: Patient Care Overview  Goal: Plan of Care/Patient Progress Review  Outcome: Improving  Vitals stable. Up ad roberth well. Voiding without difficulty. Mostly bottle feeding infant. Pain controlled with tylenol and ibuprofen. Breast pump given for home.

## 2017-11-15 NOTE — PROGRESS NOTES
OB Progress Note  2017  6:14 PM    S:  Fentanyl wearing off. Ctxs are becoming more intense again. Requesting more fentranyl.  No other complaints.      O:  /79  Temp 98.1  F (36.7  C) (Temporal)  Resp 16  LMP 2016 (Exact Date)  EFM: Category 1, early decelerations  [place]:  Ctx q3-4 min  SVE:  6.5/85%/-1  Membranes: clear fluid    Pitocin at 12 mU/min    A/P:  32 year old  @42w1d admitted for IOL for Postdates, TOLAC.   1.  Routine cares  2.  Giving second dose of fentanyl.   3.  Anticipate     Elisha Irvin

## 2017-11-16 VITALS
TEMPERATURE: 98.8 F | HEART RATE: 64 BPM | DIASTOLIC BLOOD PRESSURE: 72 MMHG | SYSTOLIC BLOOD PRESSURE: 108 MMHG | RESPIRATION RATE: 16 BRPM

## 2017-11-16 PROCEDURE — 25000132 ZZH RX MED GY IP 250 OP 250 PS 637: Performed by: REGISTERED NURSE

## 2017-11-16 PROCEDURE — 25000132 ZZH RX MED GY IP 250 OP 250 PS 637: Performed by: MIDWIFE

## 2017-11-16 RX ORDER — IBUPROFEN 200 MG
600 TABLET ORAL EVERY 6 HOURS PRN
Status: ON HOLD | COMMUNITY
Start: 2017-11-16 | End: 2021-08-13

## 2017-11-16 RX ORDER — HYDROXYZINE HYDROCHLORIDE 50 MG/1
100 TABLET, FILM COATED ORAL ONCE
Status: COMPLETED | OUTPATIENT
Start: 2017-11-16 | End: 2017-11-16

## 2017-11-16 RX ORDER — ACETAMINOPHEN 325 MG/1
650 TABLET ORAL EVERY 4 HOURS PRN
Qty: 100 TABLET | Status: ON HOLD | COMMUNITY
Start: 2017-11-16 | End: 2021-08-13

## 2017-11-16 RX ADMIN — SENNOSIDES AND DOCUSATE SODIUM 1 TABLET: 8.6; 5 TABLET ORAL at 10:39

## 2017-11-16 RX ADMIN — HYDROXYZINE HYDROCHLORIDE 100 MG: 50 TABLET, FILM COATED ORAL at 01:00

## 2017-11-16 NOTE — PLAN OF CARE
Problem: Patient Care Overview  Goal: Plan of Care/Patient Progress Review  Outcome: Adequate for Discharge Date Met: 11/16/17  D: VSS, assessments WDL.   I: Pt. received complete discharge paperwork.  Pt. was given times of last dose for all discharge medications in writing on discharge medication sheets.  Discharge teaching included home medication, pain management, activity restrictions, postpartum cares, and signs and symptoms of infection.    A: Discharge outcomes on care plan met.  Mother states understanding and comfort with self and baby cares.  P: Pt. discharged to home.  Pt. was discharged with baby, and bands were checked at time of discharge.  Pt. was accompanied by , nurse and baby, and left with personal belongings.  Home care called.  Pt. to follow up with OB per MD order.  Pt. had no further questions at the time of discharge and no unmet needs were identified.

## 2017-11-16 NOTE — PLAN OF CARE
Problem: Postpartum (Vaginal Delivery) (Adult,Obstetrics,Pediatric)  Goal: Signs and Symptoms of Listed Potential Problems Will be Absent, Minimized or Managed (Postpartum)  Signs and symptoms of listed potential problems will be absent, minimized or managed by discharge/transition of care (reference Postpartum (Vaginal Delivery) (Adult,Obstetrics,Pediatric) CPG).   Outcome: No Change  Patient doing well. Was able to sleep some during night decline pain meds.

## 2017-11-16 NOTE — PROGRESS NOTES
[facility]   Post-Partum Progress Note        Interval History:   Doing well.  Pain is adequately controlled.  No fevers.  No history of foul-smelling vaginal discharge.  Good appetite.  Denies chest pain, shortness of breath, nausea or vomiting.  Vaginal bleeding is similar to a heavy menstrual flow.  Breast and bottle feeding. Going well.            Medications:   All medications related to the patient's surgery have been reviewed          Physical Exam:   All vitals stable  Blood pressure 96/59, pulse 53, temperature 97.9  F (36.6  C), temperature source Oral, resp. rate 16, last menstrual period 12/06/2016, unknown if currently breastfeeding.  Uterine fundus is firm, non-tender and above the level of the umbilicus. Pt has full bladder.   Lower extremities: non-tender, non-edematous.              Data:     Hemoglobin   Date Value Ref Range Status   11/15/2017 11.0 (L) 11.7 - 15.7 g/dL Final   04/13/2017 11.5 (L) 11.7 - 15.7 g/dL Final   12/18/2016 7.8 (L) 11.7 - 15.7 g/dL Final   12/17/2016 8.3 (L) 11.7 - 15.7 g/dL Final   12/17/2016 8.0 (L) 11.7 - 15.7 g/dL Final            Assessment and Plan:    Assessment:   Post-partum day #2  Normal spontaneous vaginal delivery  :     Doing well.   Plan:   Discharge later today     Elisha Irvin CNM

## 2017-11-16 NOTE — LACTATION NOTE
Follow up visit.  Infant feeding well.  Daria has no concerns.  Encouraged her to keep breast feeding before bottling if able to get her supply in.  Reviewed outpatient lactation resources if needed upon discharge.   Danni Hilliard  RN, IBCLC

## 2018-06-16 ENCOUNTER — HOSPITAL ENCOUNTER (EMERGENCY)
Facility: CLINIC | Age: 33
Discharge: HOME | End: 2018-06-16
Attending: EMERGENCY MEDICINE | Admitting: EMERGENCY MEDICINE
Payer: COMMERCIAL

## 2018-06-16 VITALS
HEART RATE: 66 BPM | WEIGHT: 185 LBS | SYSTOLIC BLOOD PRESSURE: 116 MMHG | HEIGHT: 66 IN | RESPIRATION RATE: 16 BRPM | BODY MASS INDEX: 29.73 KG/M2 | DIASTOLIC BLOOD PRESSURE: 85 MMHG | TEMPERATURE: 98.3 F | OXYGEN SATURATION: 100 %

## 2018-06-16 DIAGNOSIS — K92.1 MELENA: ICD-10-CM

## 2018-06-16 DIAGNOSIS — D50.0 IRON DEFICIENCY ANEMIA DUE TO CHRONIC BLOOD LOSS: ICD-10-CM

## 2018-06-16 LAB
ABO + RH BLD: NORMAL
ABO + RH BLD: NORMAL
ALBUMIN SERPL-MCNC: 3.3 G/DL (ref 3.4–5)
ALP SERPL-CCNC: 68 U/L (ref 40–150)
ALT SERPL W P-5'-P-CCNC: 18 U/L (ref 0–50)
ANION GAP SERPL CALCULATED.3IONS-SCNC: 7 MMOL/L (ref 3–14)
AST SERPL W P-5'-P-CCNC: 16 U/L (ref 0–45)
BASOPHILS # BLD AUTO: 0 10E9/L (ref 0–0.2)
BASOPHILS NFR BLD AUTO: 0.4 %
BILIRUB SERPL-MCNC: 0.5 MG/DL (ref 0.2–1.3)
BLD GP AB SCN SERPL QL: NORMAL
BLOOD BANK CMNT PATIENT-IMP: NORMAL
BUN SERPL-MCNC: 16 MG/DL (ref 7–30)
CALCIUM SERPL-MCNC: 8.5 MG/DL (ref 8.5–10.1)
CHLORIDE SERPL-SCNC: 109 MMOL/L (ref 94–109)
CO2 SERPL-SCNC: 26 MMOL/L (ref 20–32)
CREAT SERPL-MCNC: 0.41 MG/DL (ref 0.52–1.04)
DIFFERENTIAL METHOD BLD: ABNORMAL
EOSINOPHIL # BLD AUTO: 0.1 10E9/L (ref 0–0.7)
EOSINOPHIL NFR BLD AUTO: 1.1 %
ERYTHROCYTE [DISTWIDTH] IN BLOOD BY AUTOMATED COUNT: 12.6 % (ref 10–15)
GFR SERPL CREATININE-BSD FRML MDRD: >90 ML/MIN/1.7M2
GLUCOSE SERPL-MCNC: 90 MG/DL (ref 70–99)
HCG SERPL QL: NEGATIVE
HCT VFR BLD AUTO: 30.5 % (ref 35–47)
HEMOCCULT STL QL: POSITIVE
HGB BLD-MCNC: 10.1 G/DL (ref 11.7–15.7)
IMM GRANULOCYTES # BLD: 0 10E9/L (ref 0–0.4)
IMM GRANULOCYTES NFR BLD: 0.2 %
LIPASE SERPL-CCNC: 133 U/L (ref 73–393)
LYMPHOCYTES # BLD AUTO: 1.8 10E9/L (ref 0.8–5.3)
LYMPHOCYTES NFR BLD AUTO: 39.2 %
MCH RBC QN AUTO: 30.6 PG (ref 26.5–33)
MCHC RBC AUTO-ENTMCNC: 33.1 G/DL (ref 31.5–36.5)
MCV RBC AUTO: 92 FL (ref 78–100)
MONOCYTES # BLD AUTO: 0.6 10E9/L (ref 0–1.3)
MONOCYTES NFR BLD AUTO: 12.8 %
NEUTROPHILS # BLD AUTO: 2.1 10E9/L (ref 1.6–8.3)
NEUTROPHILS NFR BLD AUTO: 46.3 %
NRBC # BLD AUTO: 0 10*3/UL
NRBC BLD AUTO-RTO: 0 /100
PLATELET # BLD AUTO: 180 10E9/L (ref 150–450)
POTASSIUM SERPL-SCNC: 3.3 MMOL/L (ref 3.4–5.3)
PROT SERPL-MCNC: 6.8 G/DL (ref 6.8–8.8)
RBC # BLD AUTO: 3.3 10E12/L (ref 3.8–5.2)
SODIUM SERPL-SCNC: 142 MMOL/L (ref 133–144)
SPECIMEN EXP DATE BLD: NORMAL
WBC # BLD AUTO: 4.6 10E9/L (ref 4–11)

## 2018-06-16 PROCEDURE — 85025 COMPLETE CBC W/AUTO DIFF WBC: CPT | Performed by: EMERGENCY MEDICINE

## 2018-06-16 PROCEDURE — 86901 BLOOD TYPING SEROLOGIC RH(D): CPT | Performed by: EMERGENCY MEDICINE

## 2018-06-16 PROCEDURE — 80053 COMPREHEN METABOLIC PANEL: CPT | Performed by: EMERGENCY MEDICINE

## 2018-06-16 PROCEDURE — 86850 RBC ANTIBODY SCREEN: CPT | Performed by: EMERGENCY MEDICINE

## 2018-06-16 PROCEDURE — 82272 OCCULT BLD FECES 1-3 TESTS: CPT | Performed by: EMERGENCY MEDICINE

## 2018-06-16 PROCEDURE — 99283 EMERGENCY DEPT VISIT LOW MDM: CPT

## 2018-06-16 PROCEDURE — 84703 CHORIONIC GONADOTROPIN ASSAY: CPT | Performed by: EMERGENCY MEDICINE

## 2018-06-16 PROCEDURE — 86900 BLOOD TYPING SEROLOGIC ABO: CPT | Performed by: EMERGENCY MEDICINE

## 2018-06-16 PROCEDURE — 25000132 ZZH RX MED GY IP 250 OP 250 PS 637: Performed by: EMERGENCY MEDICINE

## 2018-06-16 PROCEDURE — 83690 ASSAY OF LIPASE: CPT | Performed by: EMERGENCY MEDICINE

## 2018-06-16 RX ORDER — LORAZEPAM 2 MG/ML
INJECTION INTRAMUSCULAR
Status: DISCONTINUED
Start: 2018-06-16 | End: 2018-06-16 | Stop reason: WASHOUT

## 2018-06-16 RX ORDER — ACETAMINOPHEN 325 MG/1
650 TABLET ORAL ONCE
Status: COMPLETED | OUTPATIENT
Start: 2018-06-16 | End: 2018-06-16

## 2018-06-16 RX ORDER — DIPHENHYDRAMINE HYDROCHLORIDE 50 MG/ML
INJECTION INTRAMUSCULAR; INTRAVENOUS
Status: DISCONTINUED
Start: 2018-06-16 | End: 2018-06-16 | Stop reason: WASHOUT

## 2018-06-16 RX ADMIN — ACETAMINOPHEN 650 MG: 325 TABLET, FILM COATED ORAL at 18:16

## 2018-06-16 ASSESSMENT — ENCOUNTER SYMPTOMS
ABDOMINAL PAIN: 1
ROS GI COMMENTS: BLACK STOOLS
FEVER: 0
DIFFICULTY URINATING: 0
PALPITATIONS: 1
FATIGUE: 1
HEADACHES: 1

## 2018-06-16 NOTE — ED PROVIDER NOTES
History     Chief Complaint:  Multiple Complaints    HPI   Daria Jaffe is a 33 year old female with a history of anemia who presents with multiple complaints. The patient states that she has had stomach pain the past couple days. She notes that it feels like period cramps, but she had her period a week ago. The patient notes that her periods are regular and she is not pregnant. The patient states that she has also had nausea for the past few days, and has noticed that her stools have been black. The patient notes that she has had black stools once before when she was admitted into the hospital two years ago for a low hemoglobin of 4.9. They did not find a diagnosis for this hospitalization. The patient has not been taking iron supplements as her iron has turned to normal. The patient also notes a headache for the past 4 days mainly located on the left side of her forehead, and that she has felt very tired recently. The patient states that she has had difficulty breathing. The patient states that activity gives her heart palpitations and increases difficulty of breathing. The patient denies any fever or trouble urinating.     Allergies:  No Known Allergies     Medications:    Ibuprofen     Past Medical History:    Anemia  Herpes simplex  Thrombocytopenia  Goiter   Thyroid disease     Past Surgical History:    Oral Surgery  Thyroidectomy    Section    Family History:    Family history reviewed. No pertinent history.     Social History:  Smoking Status: Never Smoker  Smokeless Tobacco: Never Used  Alcohol Use: No  Marital Status:  Single      Review of Systems   Constitutional: Positive for fatigue. Negative for fever.   Respiratory:        Difficulty breathing   Cardiovascular: Positive for palpitations.   Gastrointestinal: Positive for abdominal pain (Cramping).        Black stools   Genitourinary: Negative for difficulty urinating.   Neurological: Positive for headaches.   All other systems reviewed and are  "negative.    Physical Exam     Patient Vitals for the past 24 hrs:   BP Temp Temp src Pulse Resp SpO2 Height Weight   06/16/18 1943 116/85 - - 66 16 100 % - -   06/16/18 1800 125/74 - - - - 100 % - -   06/16/18 1745 110/79 - - - - 100 % - -   06/16/18 1730 114/76 - - - - 100 % - -   06/16/18 1715 114/78 - - - - 100 % - -   06/16/18 1700 (!) 123/92 - - - - 99 % - -   06/16/18 1645 115/80 - - - - 99 % - -   06/16/18 1630 108/73 - - - - 98 % - -   06/16/18 1618 - - - - - 99 % - -   06/16/18 1616 - - - - - 99 % - -   06/16/18 1615 116/90 - - - - 99 % - -   06/16/18 1614 - - - - - 99 % - -   06/16/18 1612 - - - - - 100 % - -   06/16/18 1610 - - - - - 99 % - -   06/16/18 1608 115/87 - - - - 99 % - -   06/16/18 1530 (!) 130/92 98.3  F (36.8  C) Temporal 96 16 99 % 1.676 m (5' 6\") 83.9 kg (185 lb)         Physical Exam  Constitutional:  Well developed, Well nourished, Comfortable appearing.  HENT:  Bilateral external ears normal, Mucous membranes moist, Nose normal. Neck- Normal range of motion, Supple  Eyes: PERRL, EOMI, conjunctivae unremarkable  Respiratory:  Normal breath sounds, No respiratory distress, No wheezing,  Cardiovascular:  Normal heart rate, Normal rhythm, No murmurs,    GI:  Bowel sounds normal, Soft, Mild tenderness across upper abdomen,   : Patient has 1 uncomplicated, small hemorrhoid at approximately 6:00.  No rectal masses or tenderness.  There is black stool on digital exam.  Musculoskeletal:  Intact distal pulses, No edema, grossly unremarkable range of motion   Integument:  Warm, Dry   Neurological: Alert, attentive and oriented to person, place and time  Cranial nerves 2-12 intact;   5/5 strength throughout the upper and lower extremities;   Sensation intact to light touch throughout the upper and lower extremities;   Psychiatric:  Mood and affect normal.       Emergency Department Course   Laboratory:  Laboratory findings were communicated with the patient who voiced understanding of the " findings.    Occult blood stool: positive (A)  CBC: WBC 4.6, HGB 10.1 (L),   ABO/Rh type and screen: O+, no antibodies   HCG qualitative blood: negative   CMP: potassium 3.3 (L), albumin 3.3(L) o/w WNL (Creatinine 0.41 (L))    Interventions:  1816 Tylenol 650 mg Oral    Emergency Department Course:    1545 Nursing notes and vitals reviewed.  1552 I performed an exam of the patient as documented above.   1610 IV was inserted and blood was drawn for laboratory testing, results above.  1917 I returned to update the patient.   1922 Rectal exam performed with RN present.   1944 I personally reviewed the laboratory results with the patient and answered all related questions prior to discharge.    Impression & Plan      Medical Decision Making:  Right upper quadrantDaria Jaffe is a 33 year old female who presents with blood in stool and other systemic symptoms (headache, lightheadedness, shortness of breath, fatigue) which she was concerned represented anemia, which she has had in the past.  I considered a broad differential diagnosis for this patient including upper GIB (ulcer, gastritis, avm, tumor, etc) vs lower GIB (tumor, diverticulosis, avm, meckels, etc), colitis, aortoenteric fistula, hemorrhoids, fissures,  Ischemic colitis, bacterial stool infection (salmonella, shigella, e. Coli, campylobacter).  The workup in the ED is consistent with gastrointestinal bleeding, although it is unclear if it is lower or upper.  I favor her given the dark black stools.  Fortunately, she does not have any significant risk factors of anticoagulation, alcoholism no stomach pain or hematemesis. Bleeding is small and vitals/hgb are stable, outpatient management is indicated. Will have her restart iron supplements and see her GI specialist as soon as possible.  Return if increased bleeding, bloody stools, lightheaded when stands, worsening or new abdominal pain.  She is agreeable, completely comfortable with plan.    Diagnosis:     ICD-10-CM    1. Melena K92.1 Occult blood stool   2. Iron deficiency anemia due to chronic blood loss D50.0      Disposition:   The patient is discharged to home.    Discharge Medications:  No discharge medication.     Scribe Disclosure:  I, Jerilyn Maurer, am serving as a scribe at 3:47 PM on 6/16/2018 to document services personally performed by Pilar Harris MD based on my observations and the provider's statements to me.  [facility] EMERGENCY DEPARTMENT       Pilar Harris MD  06/16/18 [id number]

## 2018-06-16 NOTE — ED TRIAGE NOTES
ABCs intact. Pt c/o headache x 5 days. Pt has been taking tylenol, ibuprofen for pain control. None today. Pt also c/o abdominal pain starting yesterday. Denies n/v/d. Pt c/o black stools today, unsure about yesterday.

## 2018-06-16 NOTE — ED NOTES
Pt got up to bathroom with a steady gait to attempt to provide stool sample without success.  MD informed.

## 2018-06-16 NOTE — ED AVS SNAPSHOT
[facility] Emergency Department    [address]    [facility] [address]    Phone:  [phone]    Fax:  [phone]                                       Daria Jaffe   MRN: [id number]    Department:  [facility] Emergency Department   Date of Visit:  6/16/2018           After Visit Summary Signature Page     I have received my discharge instructions, and my questions have been answered. I have discussed any challenges I see with this plan with the nurse or doctor.    ..........................................................................................................................................  Patient/Patient Representative Signature      ..........................................................................................................................................  Patient Representative Print Name and Relationship to Patient    ..................................................               ................................................  Date                                            Time    ..........................................................................................................................................  Reviewed by Signature/Title    ...................................................              ..............................................  Date                                                            Time

## 2018-06-16 NOTE — ED AVS SNAPSHOT
[facility] Emergency Department    [address]    [facility] [address]    Phone:  [phone]    Fax:  [phone]                                       Daria Jaffe   MRN: [id number]    Department:  [facility] Emergency Department   Date of Visit:  6/16/2018           Patient Information     Date Of Birth          1985        Your diagnoses for this visit were:     Melena     Iron deficiency anemia due to chronic blood loss        You were seen by Pilar Harris MD.      Follow-up Information     Follow up with Stephen Gong MD.    Specialty:  Gastroenterology    Contact information:    [facility]  [address] [place] [address] DR JOSELO Aguiaran [place] [address]  [phone]          Go to [facility] Emergency Department.    Specialty:  EMERGENCY MEDICINE    Why:  As needed, If symptoms worsen    Contact information:    [address]  [facility] [address]  [phone]        Discharge Instructions       Start taking daily over the counter iron supplements.    Lower GI Bleeding (Stable)  You have signs of blood in your stool. This is called rectal bleeding. The bleeding may have begun in another part of your gastrointestinal (GI) tract. If the blood is bright red, it is likely coming from the lower part of the GI tract. If the blood is black or dark, it might be coming from higher up in the GI tract. Very small amounts of GI bleeding may not be visible and can only be discovered during a test on your stool. Possible causes of lower GI bleeding include:    Hemorrhoids    Anal fissures    Diverticulitis    Inflammatory bowel disease (Crohn's disease or ulcerative colitis)    Polyps (growths) in the intestine  Note: Iron supplements and medicines for diarrhea or upset stomach can cause black stools. Foods such as licorice and red beets can also discolor the stool and be mistaken for bleeding. These are not bleeding and are not a cause for  alarm.  Home care  You have not lost a large amount of blood and your condition appears stable at this time. You may resume normal activity as long as you feel well.  Don't take NSAIDs, such as aspirin, ibuprofen, or naproxen. They can irritate the stomach and cause further bleeding. If you are taking these medicines for other medical reasons, talk to your healthcare provider before you stop them.   Follow-up care  Follow up with your healthcare provider as advised. Further tests may be needed to find the cause of your bleeding.  When to seek medical advice  Call your healthcare provider right away for any of the following:    Large amount of rectal bleeding     Increasing abdominal pain    Weakness, dizziness  Call 911  Call 911 if any of the following occur:    Loss of consciousness    Vomiting blood  Date Last Reviewed: 6/24/2015 2000-2017 [employer]. [address] [place] [address]. All rights reserved. This information is not intended as a substitute for professional medical care. Always follow your healthcare professional's instructions.        Iron-Deficiency Anemia (Adult)  Red blood cells carry oxygen to the tissues of your body. Anemia is a condition in which you have too few red blood cells. You need iron to make red cells. Anemia makes you feel tired and run down. When anemia becomes severe, your skin becomes pale. You may feel short of breath after physical activity. Other symptoms include:    Headaches    Dizziness    Leg cramps with physical activity    Drowsiness    Restless legs  Your anemia is caused by not having enough iron in your body. This may be because of:    Loss of blood. This can be caused by heavy menstrual periods. It can also be caused by bleeding from the stomach or intestines.    Poor diet. You may not be eating enough foods that contain iron.    Inability to absorb iron from the foods you eat    Pregnancy  If your blood count is low enough, your  healthcare provider may prescribe an iron supplement. It usually takes about 2 to 3 months of treatment with iron supplements to correct anemia. Severe cases of anemia need a blood transfusion to quickly ease symptoms and deliver more oxygen to the cells.  Home care  Follow these guidelines when caring for yourself at home:    Eat foods high in iron. This will boost the amount of iron stored in your body. It is a natural way to build up the number of blood cells. Good sources of iron include beef, liver, spinach and other dark green leafy vegetables, whole grains, beans, and nuts.    Do not overexert yourself.    Talk with your healthcare provider before traveling by air or traveling to high altitudes.  Follow-up care  Follow up with your healthcare provider in 2 months, or as advised. This is to have another red blood cell count to be sure your anemia has been fixed.  When to seek medical advice  Call your healthcare provider right away if any of these occur:    Shortness of breath or chest pain    Dizziness or fainting    Vomiting blood or passing red or black-colored stool   Date Last Reviewed: 2/25/2016 2000-2017 [employer]. [address] [place] [address]. All rights reserved. This information is not intended as a substitute for professional medical care. Always follow your healthcare professional's instructions.            24 Hour Appointment Hotline       To make an appointment at any [facility] clinic, call [phone] ([phone]). If you don't have a family doctor or clinic, we will help you find one. [facility] clinics are conveniently located to serve the needs of you and your family.             Review of your medicines      Our records show that you are taking the medicines listed below. If these are incorrect, please call your family doctor or clinic.        Dose / Directions Last dose taken    acetaminophen 325 MG tablet   Commonly known as:  TYLENOL   Dose:  650 mg    Quantity:  100 tablet        Take 2 tablets (650 mg) by mouth every 4 hours as needed for mild pain or fever (greater than or equal to 38? C /100.4? F (oral) or 38.5? C/ 101.4? F (core).)   Refills:  0        ibuprofen 200 MG tablet   Commonly known as:  ADVIL/MOTRIN   Dose:  600 mg        Take 3 tablets (600 mg) by mouth every 6 hours as needed for other (cramping)   Refills:  0        Prenatal Vitamins 28-0.8 MG Tabs   Dose:  1 tablet        Take 1 tablet by mouth daily   Refills:  0                Procedures and tests performed during your visit     ABO/Rh type and screen    CBC with platelets differential    Comprehensive metabolic panel    HCG qualitative Blood    Lipase    Occult blood stool    Peripheral IV catheter      Orders Needing Specimen Collection     None      Pending Results     Date and Time Order Name Status Description    6/16/2018 1604 Occult blood stool In process             Pending Culture Results     No orders found from 6/14/2018 to 6/17/2018.            Pending Results Instructions     If you had any lab results that were not finalized at the time of your Discharge, you can call the ED Lab Result RN at [phone]. You will be contacted by this team for any positive Lab results or changes in treatment. The nurses are available 7 days a week from 10A to 6:30P.  You can leave a message 24 hours per day and they will return your call.        Test Results From Your Hospital Stay        6/16/2018  4:32 PM      Component Results     Component Value Ref Range & Units Status    WBC 4.6 4.0 - 11.0 10e9/L Final    RBC Count 3.30 (L) 3.8 - 5.2 10e12/L Final    Hemoglobin 10.1 (L) 11.7 - 15.7 g/dL Final    Hematocrit 30.5 (L) 35.0 - 47.0 % Final    MCV 92 78 - 100 fl Final    MCH 30.6 26.5 - 33.0 pg Final    MCHC 33.1 31.5 - 36.5 g/dL Final    RDW 12.6 10.0 - 15.0 % Final    Platelet Count 180 150 - 450 10e9/L Final    Diff Method Automated Method  Final    % Neutrophils 46.3 % Final    %  Lymphocytes 39.2 % Final    % Monocytes 12.8 % Final    % Eosinophils 1.1 % Final    % Basophils 0.4 % Final    % Immature Granulocytes 0.2 % Final    Nucleated RBCs 0 0 /100 Final    Absolute Neutrophil 2.1 1.6 - 8.3 10e9/L Final    Absolute Lymphocytes 1.8 0.8 - 5.3 10e9/L Final    Absolute Monocytes 0.6 0.0 - 1.3 10e9/L Final    Absolute Eosinophils 0.1 0.0 - 0.7 10e9/L Final    Absolute Basophils 0.0 0.0 - 0.2 10e9/L Final    Abs Immature Granulocytes 0.0 0 - 0.4 10e9/L Final    Absolute Nucleated RBC 0.0  Final         6/16/2018  5:08 PM      Component Results     Component Value Ref Range & Units Status    ABO O  Final    RH(D) Pos  Final    Antibody Screen Neg  Final    Test Valid Only At [facility]     Final    Specimen Expires 06/19/2018  Final         6/16/2018  4:58 PM      Component Results     Component Value Ref Range & Units Status    HCG Qualitative Serum Negative NEG^Negative Final    This test is for screening purposes.  Results should be interpreted along with   the clinical picture.  Confirmation testing is available if warranted by   ordering VQL990, HCG Quantitative Pregnancy.           6/16/2018  4:50 PM      Component Results     Component Value Ref Range & Units Status    Sodium 142 133 - 144 mmol/L Final    Potassium 3.3 (L) 3.4 - 5.3 mmol/L Final    Chloride 109 94 - 109 mmol/L Final    Carbon Dioxide 26 20 - 32 mmol/L Final    Anion Gap 7 3 - 14 mmol/L Final    Glucose 90 70 - 99 mg/dL Final    Urea Nitrogen 16 7 - 30 mg/dL Final    Creatinine 0.41 (L) 0.52 - 1.04 mg/dL Final    GFR Estimate >90 >60 mL/min/1.7m2 Final    Non  GFR Calc    GFR Estimate If Black >90 >60 mL/min/1.7m2 Final    African American GFR Calc    Calcium 8.5 8.5 - 10.1 mg/dL Final    Bilirubin Total 0.5 0.2 - 1.3 mg/dL Final    Albumin 3.3 (L) 3.4 - 5.0 g/dL Final    Protein Total 6.8 6.8 - 8.8 g/dL Final    Alkaline Phosphatase 68 40 - 150 U/L Final    ALT 18 0 - 50 U/L Final    AST 16 0  - 45 U/L Final         6/16/2018  4:50 PM      Component Results     Component Value Ref Range & Units Status    Lipase 133 73 - 393 U/L Final         6/16/2018  7:26 PM                Clinical Quality Measure: Blood Pressure Screening     Your blood pressure was checked while you were in the emergency department today. The last reading we obtained was  BP: 125/74 . Please read the guidelines below about what these numbers mean and what you should do about them.  If your systolic blood pressure (the top number) is less than 120 and your diastolic blood pressure (the bottom number) is less than 80, then your blood pressure is normal. There is nothing more that you need to do about it.  If your systolic blood pressure (the top number) is 120-139 or your diastolic blood pressure (the bottom number) is 80-89, your blood pressure may be higher than it should be. You should have your blood pressure rechecked within a year by a primary care provider.  If your systolic blood pressure (the top number) is 140 or greater or your diastolic blood pressure (the bottom number) is 90 or greater, you may have high blood pressure. High blood pressure is treatable, but if left untreated over time it can put you at risk for heart attack, stroke, or kidney failure. You should have your blood pressure rechecked by a primary care provider within the next 4 weeks.  If your provider in the emergency department today gave you specific instructions to follow-up with your doctor or provider even sooner than that, you should follow that instruction and not wait for up to 4 weeks for your follow-up visit.        Thank you for choosing [facility]       Thank you for choosing [facility] for your care. Our goal is always to provide you with excellent care. Hearing back from our patients is one way we can continue to improve our services. Please take a few minutes to complete the written survey that you may receive in the mail after you visit with us.  "Thank you!        [employer] Information     [employer] lets you send messages to your doctor, view your test results, renew your prescriptions, schedule appointments and more. To sign up, go to [email or web address] . Click on \"Log in\" on the left side of the screen, which will take you to the Welcome page. Then click on \"Sign up Now\" on the right side of the page.     You will be asked to enter the access code listed below, as well as some personal information. Please follow the directions to create your username and password.     Your access code is: [email or web address]  Expires: 2018  7:35 PM     Your access code will  in 90 days. If you need help or a new code, please call your [facility] clinic or [phone].        Care EveryWhere ID     This is your Care EveryWhere ID. This could be used by other organizations to access your [facility] medical records  [id number]        Equal Access to Services     MATTHEW HOWARD : Hadii aad ku hadasho Socheyenne, waaxda luqadaha, qaybta kaalmada adeegyada, asia menchaca . So [facility] [phone].    ATENCIÓN: Si habla español, tiene a wadsworth disposición servicios gratuitos de asistencia lingüística. Llame al [phone].    We comply with applicable federal civil rights laws and [place] laws. We do not discriminate on the basis of race, color, national origin, age, disability, sex, sexual orientation, or gender identity.            After Visit Summary       This is your record. Keep this with you and show to your community pharmacist(s) and doctor(s) at your next visit.                  "

## 2018-06-17 NOTE — DISCHARGE INSTRUCTIONS
Start taking daily over the counter iron supplements.    Lower GI Bleeding (Stable)  You have signs of blood in your stool. This is called rectal bleeding. The bleeding may have begun in another part of your gastrointestinal (GI) tract. If the blood is bright red, it is likely coming from the lower part of the GI tract. If the blood is black or dark, it might be coming from higher up in the GI tract. Very small amounts of GI bleeding may not be visible and can only be discovered during a test on your stool. Possible causes of lower GI bleeding include:    Hemorrhoids    Anal fissures    Diverticulitis    Inflammatory bowel disease (Crohn's disease or ulcerative colitis)    Polyps (growths) in the intestine  Note: Iron supplements and medicines for diarrhea or upset stomach can cause black stools. Foods such as licorice and red beets can also discolor the stool and be mistaken for bleeding. These are not bleeding and are not a cause for alarm.  Home care  You have not lost a large amount of blood and your condition appears stable at this time. You may resume normal activity as long as you feel well.  Don't take NSAIDs, such as aspirin, ibuprofen, or naproxen. They can irritate the stomach and cause further bleeding. If you are taking these medicines for other medical reasons, talk to your healthcare provider before you stop them.   Follow-up care  Follow up with your healthcare provider as advised. Further tests may be needed to find the cause of your bleeding.  When to seek medical advice  Call your healthcare provider right away for any of the following:    Large amount of rectal bleeding     Increasing abdominal pain    Weakness, dizziness  Call 911  Call 911 if any of the following occur:    Loss of consciousness    Vomiting blood  Date Last Reviewed: 6/24/2015 2000-2017 [employer]. [address] [place] [address]. All rights reserved. This information is not intended as a substitute  for professional medical care. Always follow your healthcare professional's instructions.        Iron-Deficiency Anemia (Adult)  Red blood cells carry oxygen to the tissues of your body. Anemia is a condition in which you have too few red blood cells. You need iron to make red cells. Anemia makes you feel tired and run down. When anemia becomes severe, your skin becomes pale. You may feel short of breath after physical activity. Other symptoms include:    Headaches    Dizziness    Leg cramps with physical activity    Drowsiness    Restless legs  Your anemia is caused by not having enough iron in your body. This may be because of:    Loss of blood. This can be caused by heavy menstrual periods. It can also be caused by bleeding from the stomach or intestines.    Poor diet. You may not be eating enough foods that contain iron.    Inability to absorb iron from the foods you eat    Pregnancy  If your blood count is low enough, your healthcare provider may prescribe an iron supplement. It usually takes about 2 to 3 months of treatment with iron supplements to correct anemia. Severe cases of anemia need a blood transfusion to quickly ease symptoms and deliver more oxygen to the cells.  Home care  Follow these guidelines when caring for yourself at home:    Eat foods high in iron. This will boost the amount of iron stored in your body. It is a natural way to build up the number of blood cells. Good sources of iron include beef, liver, spinach and other dark green leafy vegetables, whole grains, beans, and nuts.    Do not overexert yourself.    Talk with your healthcare provider before traveling by air or traveling to high altitudes.  Follow-up care  Follow up with your healthcare provider in 2 months, or as advised. This is to have another red blood cell count to be sure your anemia has been fixed.  When to seek medical advice  Call your healthcare provider right away if any of these occur:    Shortness of breath or chest  pain    Dizziness or fainting    Vomiting blood or passing red or black-colored stool   Date Last Reviewed: 2/25/2016 2000-2017 [employer]. [address] [place] [address], [place] [address]. All rights reserved. This information is not intended as a substitute for professional medical care. Always follow your healthcare professional's instructions.

## 2018-07-16 ENCOUNTER — HOSPITAL ENCOUNTER (OUTPATIENT)
Dept: NUCLEAR MEDICINE | Facility: CLINIC | Age: 33
Setting detail: NUCLEAR MEDICINE
Discharge: HOME | End: 2018-07-16
Attending: INTERNAL MEDICINE | Admitting: INTERNAL MEDICINE
Payer: COMMERCIAL

## 2018-07-16 DIAGNOSIS — K92.1 MELENA: ICD-10-CM

## 2018-07-16 LAB — RADIOLOGIST FLAGS: ABNORMAL

## 2018-07-16 PROCEDURE — 78290 INTESTINE IMAGING: CPT

## 2018-07-16 PROCEDURE — 34300033 ZZH RX 343: Performed by: RADIOLOGY

## 2018-07-16 PROCEDURE — A9512 TC99M PERTECHNETATE: HCPCS | Performed by: RADIOLOGY

## 2018-07-16 RX ADMIN — Medication 15 MCI.: at 12:00

## 2021-06-25 ENCOUNTER — TRANSCRIBE ORDERS (OUTPATIENT)
Dept: MATERNAL FETAL MEDICINE | Facility: CLINIC | Age: 36
End: 2021-06-25

## 2021-06-25 ENCOUNTER — HOSPITAL ENCOUNTER (OUTPATIENT)
Dept: ULTRASOUND IMAGING | Facility: CLINIC | Age: 36
End: 2021-06-25
Attending: REGISTERED NURSE
Payer: COMMERCIAL

## 2021-06-25 ENCOUNTER — PRE VISIT (OUTPATIENT)
Dept: MATERNAL FETAL MEDICINE | Facility: CLINIC | Age: 36
End: 2021-06-25

## 2021-06-25 ENCOUNTER — OFFICE VISIT (OUTPATIENT)
Dept: MATERNAL FETAL MEDICINE | Facility: CLINIC | Age: 36
End: 2021-06-25
Attending: REGISTERED NURSE
Payer: COMMERCIAL

## 2021-06-25 DIAGNOSIS — O35.00X0 CENTRAL NERVOUS SYSTEM MALFORMATION IN FETUS AFFECTING OBSTETRICAL CARE, SINGLE OR UNSPECIFIED FETUS: Primary | ICD-10-CM

## 2021-06-25 DIAGNOSIS — O26.90 PREGNANCY RELATED CONDITION, ANTEPARTUM: Primary | ICD-10-CM

## 2021-06-25 DIAGNOSIS — O26.90 PREGNANCY RELATED CONDITION, ANTEPARTUM: ICD-10-CM

## 2021-06-25 PROCEDURE — 76819 FETAL BIOPHYS PROFIL W/O NST: CPT

## 2021-06-25 PROCEDURE — 99204 OFFICE O/P NEW MOD 45 MIN: CPT | Mod: 25 | Performed by: OBSTETRICS & GYNECOLOGY

## 2021-06-25 PROCEDURE — 76819 FETAL BIOPHYS PROFIL W/O NST: CPT | Mod: 26 | Performed by: OBSTETRICS & GYNECOLOGY

## 2021-06-25 PROCEDURE — 76811 OB US DETAILED SNGL FETUS: CPT | Mod: 26 | Performed by: OBSTETRICS & GYNECOLOGY

## 2021-06-25 NOTE — PROGRESS NOTES
Patient is a 36 year old  at 33 weeks and 1 day who [place] from [facility] where she received prenatal care. 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 Jaffe has no history of NTD in the family. Her first pregnancy was delivered vaginally at term. The second pregnancy was a CD and the last three term deliveries were .     I have reviewed MS Jaffe past medical history and family medical history.     We reviewed the diagnosis of NTD and discussed that 50% of NTD are in the spine and 50% associated with anencephaly. Of the spinal NTD, over 90% are located in the lumbosacral spine and can include meninges with or without neural tissue. This can cause lack of upper migration of the cauda equina or conus terminales with presence of Arnold Chiari Type II findings as well as associated ventriculomegaly. These were identified on today's scan.    We talked about the small association with aneuploidy as most of these defects are not associated with chromosomal abnormalities. We discussed that no other structural abnormalities have been identified but that due to the advanced gestational age  assessment is warranted.    We reviewed naveed for 4-5 mg daily folic acid supplementation with future pregnancy as most cases have been associated with low folate levels. This should be started 2-3 months before planned conception.    We discussed management including weekly BPP until delivery, full term delivery without indication for early term delivery. We discussed mode of delivery can be TOLAC/ especially if there is SOL.    I have recommended delivery at [facility] for   intervention. This can be achieved by KIP to [facility], prenatal assessment with neonatology and pediatric neurosurgery.    Recommend continuing weekly surveillance with BPP until delivery.    Stephen Hoyt M.D.  Maternal Fetal Medicine    The patient had all her questions answered. She voiced understanding of the plan of care and her satisfaction with our care today. Thank you for the opportunity to participate in the care of Ms. Jaffe. Please do not hesitate to contact us if you may have any questions or concerns.       I spent 0 minutes prior to the visit preparing to see the patient (reviewing medical records and tests).      I spent 35 minutes face-face-to-face with the patient during the visit with the majority (>50%) spent on counseling and coordination of care with the patient and/or family members.      I spent 10 minutes after the visit with the patient documenting the visit in the electronic health record and/or communicating with other health care professionals and/or care coordination.      Total time spent on today s date of service: 45 minutes.

## 2021-06-25 NOTE — NURSING NOTE
Pt presents to [facility] for L2 due to abnormal US scan at primary OB clinic, concern for CNS malformation. Pt returned from [facility] 2 weeks ago and established prenatal care with [facility]. Dr. Hoyt met with patient to discuss US findings. markedup  utilized via iPad. Pt will transfer care to [facility] for remainder of pregnancy, weekly BPP. Pt scheduled for visit at [facility] next week. [facility] notified of confirmed findings and plan for KIP.    Jaci Houser RN

## 2021-06-28 ENCOUNTER — TRANSFERRED RECORDS (OUTPATIENT)
Dept: HEALTH INFORMATION MANAGEMENT | Facility: CLINIC | Age: 36
End: 2021-06-28

## 2021-06-29 ENCOUNTER — TELEPHONE (OUTPATIENT)
Dept: NEUROSURGERY | Facility: CLINIC | Age: 36
End: 2021-06-29

## 2021-06-29 DIAGNOSIS — O35.00X0 NEURAL TUBE DEFECT OF FETUS AFFECTING SINGLETON PREGNANCY: Primary | ICD-10-CM

## 2021-06-29 NOTE — PROGRESS NOTES
Maternal-Fetal Medicine   First OB Visit    Daria Jaffe  : 1985  MRN: [id number]      HPI:  Daria Jaffe is a 36 year old  at 33w6d by LMP for new OB visit. Patient initiated OB care in [facility] and reports she had an early US that aligned with her NAYANA - outside records are not available.     Today Daria Jaffe is feeling well. She needs a renewal on her prenatal vitamin and is requesting something to help with heartburn. She is having significant heartburn symptoms daily. Otherwise, offers no pregnancy concerns at this time. Denies bleeding or loss of fluid. Reports occasional BH contractions with prolonged walking. Normal fetal movement. Declines GCT.      Prenatal Care:  Primary OB care this pregnancy has been with Elisha Irvin CNM from [facility].    Dating:    LMP: 20, cycles regular  Dating ultrasound: records unavailable  Assisted reproduction: none  Assigned EDC: 21 by LMP      OB HISTORY  OB History    Para Term  AB Living   6 5 5 0 0 5   SAB TAB Ectopic Multiple Live Births   0 0 0 0 5      # Outcome Date GA Lbr Richard/2nd Weight Sex Delivery Anes PTL Lv   6 Current            5 Term 17 42w1d 03:28 / 00:05 3.82 kg (8 lb 6.8 oz) F  Local, IV N FRANKI      Name: JARET JAFFE      Apgar1: 9  Apgar5: 9   4 Term 14 41w2d 03:08 / 00:04 3.935 kg (8 lb 10.8 oz) M  None N FRANKI      Name: Muba      Apgar1: 8  Apgar5: 9   3 Term 08/10/12 40w1d 05:20 / 00:08 3.629 kg (8 lb) M  Local N FRANKI      Name: Cecilia Med      Apgar1: 9  Apgar5: 9   2 Term 11 37w0d    -SEC   FRANKI      Name: Cyndee   1 Term 04/27/10 41w0d   F    FRANKI      Name: Barrington Basilio       History of GDM: No,  PTL : No,  History of HTN in pregnancy: No,  Thrombocytopenia: Yes - with 4th pregnancy in   Shoulder dystocia: No,  Vacuum Extraction: No  PPH: Yes -  3rd degree laceration, sulcus tears, cervical laceration requiring general  anesthesia to repair, ongoing bleeding resulted in  IR embolization of  uterine arteries and pudenal arteries, multiple transfusions  3rd of 4th degree laceration: Yes .   Other complications: No      Past Medical History:  Past Medical History:   Diagnosis Date     Anemia     needed 2 units of blood for PPH     Herpes simplex without mention of complication     last outbreak in July 2012     History of blood transfusion      Thyroid disease     thyroid nodule, normal TSH       Past Surgical History:  Past Surgical History:   Procedure Laterality Date     c/section[  second baby     COLONOSCOPY N/A 12/18/2016    Procedure: COLONOSCOPY;  Surgeon: Danni Andino MD;  Location:  GI     emobolization of pudendal and uterine arteries[  2010     ESOPHAGOSCOPY, GASTROSCOPY, DUODENOSCOPY (EGD), COMBINED N/A 12/16/2016    Procedure: COMBINED ESOPHAGOSCOPY, GASTROSCOPY, DUODENOSCOPY (EGD), BIOPSY SINGLE OR MULTIPLE;  Surgeon: Stephen Gong MD;  Location:  GI     HEAD & NECK SURGERY      ORAL SURGERY     THYROIDECTOMY Right 11/24/2015    Procedure: THYROIDECTOMY;  Surgeon: Ankush Noe MD;  Location:  OR         INFECTION HISTORY  HIV: No  Hepatitis B: No  Hepatitis C: No  Tuberculosis: No    Genital Herpes self: yes  Herpes partner:  unknown  Chlamydia:  no  Gonorrhea:  no  Syphilis:  No    Current Medications:  Prior to Admission medications    Medication Sig Last Dose Taking? Auth Provider   acetaminophen (TYLENOL) 325 MG tablet Take 2 tablets (650 mg) by mouth every 4 hours as needed for mild pain or fever (greater than or equal to 38  C /100.4  F (oral) or 38.5  C/ 101.4  F (core).)   Elisha Irvin APRN CNM   ibuprofen (ADVIL/MOTRIN) 200 MG tablet Take 3 tablets (600 mg) by mouth every 6 hours as needed for other (cramping)   Elisha Irvin APRN CNM   Prenatal Vit-Fe Fumarate-FA (PRENATAL VITAMINS) 28-0.8 MG TABS Take 1 tablet by mouth daily   Reported, Patient         Allergies:  Patient has no known allergies.    Social  "History:   Status:  - feels safe  Denies use of alcohol, drugs or smoking.    Family History:  Denies history of genetic disorders, preeeclampsia, thromboembolic disease, bleeding disorders, mental retardation    ROS:  10-point ROS negative except as in HPI     PHYSICAL EXAM:    LMP 2020     Gen: NAD, well appearing  Respiratory: breathing unlabored, no SOB,  lungs clear  Abdomen: gravid, non-tender, non-distended  Extremities: WNL      Prenatal Labs:    Reviewed, see prenatal tab.       Ultrasounds:   Please see \"imaging\" tab under chart review for today's ultrasound results.        ASSESSMENT/PLAN:    Daria Jaffe is a 36 year old  at 33w6d here for new OB visit     Pregnancy complicated by:   Fetal Dx:  - NTD - sacral defect S1-S2  - Bilateral ventriculomegaly R<L    Maternal Dx:  - AMA  - Hx of heart murmur - normal echo in 2010  - Hx  x4 (3x after c/s)  - Hx of elective  in  due to 3rd deg lac, sulcus tears, and cervical lac.   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 - no issues since treatment (2017)  - Thyroid nodule - s/p right thyroid lobectomy in . No meds.        #Transfer of Care:  Oriented patient to [facility] practice. Reviewed that [facility] is a multidisciplinary institution and her care will be collaborative in fashion with [facility] doctors, CNM, residents, fellows, and [facility] clinic for intrapartum management. Patient has preference for CNM care and would be appropriate for such. Plan for [facility] CNMs for intrapartum management.    #NTD:  - Plan for NICU and peds surgery consult in the coming weeks.  - NICU for delivery with subsequent admission.    #Routine PNC:  - Prenatal labs:   Rh: +  antibody: neg   Hgb: 12.0 on    HepB/HIV/RPR/HepC: nonreactive   GC/CT: negative   Rubella: immune     GCT: fasting of 74  A1c: 5.4 on  - declines GCT.   UC: mixed socorro   TSH: 1.71 on   - Immunizations: offer Tdap at next visit.    # " Surveillance:  - Weekly BPPs  - Serial growth US    #Delivery Planning:  - Reviewed recommendation for delivery by NAYANA in the setting of NTD. Patient has a personal hx of 41-42 week gestations. Would be open to IOL at 40wks if spontaneous labor does not occur.       Sandra Laird CNM on 6/29/2021 at 1:23 PM

## 2021-06-30 ENCOUNTER — OFFICE VISIT (OUTPATIENT)
Dept: MATERNAL FETAL MEDICINE | Facility: CLINIC | Age: 36
End: 2021-06-30
Attending: OBSTETRICS & GYNECOLOGY
Payer: COMMERCIAL

## 2021-06-30 ENCOUNTER — HOSPITAL ENCOUNTER (OUTPATIENT)
Dept: ULTRASOUND IMAGING | Facility: CLINIC | Age: 36
End: 2021-06-30
Attending: OBSTETRICS & GYNECOLOGY
Payer: COMMERCIAL

## 2021-06-30 VITALS
HEART RATE: 104 BPM | OXYGEN SATURATION: 100 % | WEIGHT: 205.6 LBS | DIASTOLIC BLOOD PRESSURE: 67 MMHG | SYSTOLIC BLOOD PRESSURE: 131 MMHG | BODY MASS INDEX: 33.18 KG/M2

## 2021-06-30 DIAGNOSIS — R12 HEARTBURN DURING PREGNANCY IN THIRD TRIMESTER: ICD-10-CM

## 2021-06-30 DIAGNOSIS — O35.00X0 NEURAL TUBE DEFECT OF FETUS AFFECTING SINGLETON PREGNANCY: ICD-10-CM

## 2021-06-30 DIAGNOSIS — A04.8 H. PYLORI INFECTION: ICD-10-CM

## 2021-06-30 DIAGNOSIS — A60.04 HERPES SIMPLEX VULVOVAGINITIS: ICD-10-CM

## 2021-06-30 DIAGNOSIS — O35.00X0 NEURAL TUBE DEFECT OF FETUS AFFECTING SINGLETON PREGNANCY: Primary | ICD-10-CM

## 2021-06-30 DIAGNOSIS — O26.893 HEARTBURN DURING PREGNANCY IN THIRD TRIMESTER: ICD-10-CM

## 2021-06-30 DIAGNOSIS — O09.93 SUPERVISION OF HIGH RISK PREGNANCY IN THIRD TRIMESTER: Primary | ICD-10-CM

## 2021-06-30 DIAGNOSIS — O35.00X0 CENTRAL NERVOUS SYSTEM MALFORMATION IN FETUS AFFECTING OBSTETRICAL CARE, SINGLE OR UNSPECIFIED FETUS: ICD-10-CM

## 2021-06-30 PROBLEM — Z34.80 SUPERVISION OF OTHER NORMAL PREGNANCY, ANTEPARTUM: Status: RESOLVED | Noted: 2017-04-13 | Resolved: 2021-06-30

## 2021-06-30 PROCEDURE — 99213 OFFICE O/P EST LOW 20 MIN: CPT | Mod: 25 | Performed by: ADVANCED PRACTICE MIDWIFE

## 2021-06-30 PROCEDURE — G0463 HOSPITAL OUTPT CLINIC VISIT: HCPCS | Mod: 25

## 2021-06-30 PROCEDURE — 76819 FETAL BIOPHYS PROFIL W/O NST: CPT | Mod: 26 | Performed by: OBSTETRICS & GYNECOLOGY

## 2021-06-30 PROCEDURE — 76819 FETAL BIOPHYS PROFIL W/O NST: CPT

## 2021-06-30 RX ORDER — PNV NO.95/FERROUS FUM/FOLIC AC 28MG-0.8MG
1 TABLET ORAL DAILY
Qty: 90 TABLET | Refills: 3 | Status: SHIPPED | OUTPATIENT
Start: 2021-06-30

## 2021-06-30 ASSESSMENT — PAIN SCALES - GENERAL: PAINLEVEL: NO PAIN (0)

## 2021-06-30 ASSESSMENT — PATIENT HEALTH QUESTIONNAIRE - PHQ9: SUM OF ALL RESPONSES TO PHQ QUESTIONS 1-9: 1

## 2021-06-30 NOTE — NURSING NOTE
Daria seen in clinic today for BPP and KIP prenatal visit at 33w6d gestation due to pregnancy c/b fetal NTD (see report/notes). VSS. Pt reports + FM. Pt denies bldg/lof/change in discharge/contractions/headache/vision changes/chest pain/SOB/edema. Reports treating a yeast infection with OTC meds 2 weeks ago with symptoms resolved. Pt c/o heartburn which she has been treating with a friend's omeprazole. New patient folder and contact information reviewed and given to Daria. Sandra Laird CNM met with pt and discussed POC. Plan to continue weekly BPP, routine OB visits, f/u growth on 7/21. Pt scheduled to meet with NICU/SW and neurosurgery. Pt declined Tdap and GCT. A1C and fasting glucose done at [facility] on 6/24 WNL.  Pt discharged stable and ambulatory.     Jaci Houser RN

## 2021-06-30 NOTE — PROGRESS NOTES
"Please see \"Imaging\" tab under \"Chart Review\" for details of today's US at the [facility].    Delbert Santos MD  Maternal-Fetal Medicine      "

## 2021-07-07 ENCOUNTER — HOSPITAL ENCOUNTER (OUTPATIENT)
Dept: ULTRASOUND IMAGING | Facility: CLINIC | Age: 36
End: 2021-07-07
Attending: OBSTETRICS & GYNECOLOGY
Payer: COMMERCIAL

## 2021-07-07 ENCOUNTER — OFFICE VISIT (OUTPATIENT)
Dept: MATERNAL FETAL MEDICINE | Facility: CLINIC | Age: 36
End: 2021-07-07
Attending: OBSTETRICS & GYNECOLOGY
Payer: COMMERCIAL

## 2021-07-07 VITALS
OXYGEN SATURATION: 98 % | BODY MASS INDEX: 33.09 KG/M2 | SYSTOLIC BLOOD PRESSURE: 119 MMHG | RESPIRATION RATE: 18 BRPM | HEART RATE: 95 BPM | DIASTOLIC BLOOD PRESSURE: 85 MMHG | WEIGHT: 205 LBS

## 2021-07-07 DIAGNOSIS — O09.93 SUPERVISION OF HIGH RISK PREGNANCY IN THIRD TRIMESTER: Primary | ICD-10-CM

## 2021-07-07 DIAGNOSIS — O35.00X0 NEURAL TUBE DEFECT OF FETUS AFFECTING SINGLETON PREGNANCY: Primary | ICD-10-CM

## 2021-07-07 DIAGNOSIS — O35.00X0 CENTRAL NERVOUS SYSTEM MALFORMATION IN FETUS AFFECTING OBSTETRICAL CARE, SINGLE OR UNSPECIFIED FETUS: ICD-10-CM

## 2021-07-07 DIAGNOSIS — O35.00X0 NEURAL TUBE DEFECT OF FETUS AFFECTING SINGLETON PREGNANCY: ICD-10-CM

## 2021-07-07 PROCEDURE — G0463 HOSPITAL OUTPT CLINIC VISIT: HCPCS | Mod: 25

## 2021-07-07 PROCEDURE — 76819 FETAL BIOPHYS PROFIL W/O NST: CPT | Mod: 26 | Performed by: OBSTETRICS & GYNECOLOGY

## 2021-07-07 PROCEDURE — 99212 OFFICE O/P EST SF 10 MIN: CPT | Mod: 25 | Performed by: ADVANCED PRACTICE MIDWIFE

## 2021-07-07 PROCEDURE — 76819 FETAL BIOPHYS PROFIL W/O NST: CPT

## 2021-07-07 ASSESSMENT — PAIN SCALES - GENERAL: PAINLEVEL: NO PAIN (0)

## 2021-07-07 NOTE — NURSING NOTE
Daria seen in clinic today for BPP and OB visit at 34w6d gestation due to pregnancy c/b fetal NTD (see report/notes). VSS. Pt reports + FM. Pt denies bldg/lof/change in discharge/contractions/headache/vision changes/chest pain/SOB/edema. Plans to  PNV and prilosec today. Reviewed upcoming appts with neurosurgery, NICU, MFM. Sandra Laird CNM met with pt and discussed POC. Plan to continue weekly BPP, serial growth (7/21). IOL 8/12. TOLAC consent signed 6/30.  Pt discharged stable and ambulatory.     Jaci Houser RN

## 2021-07-07 NOTE — PROGRESS NOTES
"Maternal fetal Medicine OB Follow up visit.     Bear Jaffe  : 1985  MRN: [id number]    CC: OB Follow-up    Subjective:  Bear Jaffe is a 36 year old  at 34w6d presenting for routine OB follow-up. Today, she is feeling well - offers no pregnancy concerns at this time. Patient denies regular, painful contractions, denies loss of fluid or vaginal bleeding.  Reports fetal movement.      Has not picked up her prescription for heartburn, but plans to do that today.     OB Hx:  OB History    Para Term  AB Living   6 5 5 0 0 5   SAB TAB Ectopic Multiple Live Births   0 0 0 0 5      # Outcome Date GA Lbr Richard/2nd Weight Sex Delivery Anes PTL Lv   6 Current            5 Term 17 42w1d 03:28 / 00:05 3.82 kg (8 lb 6.8 oz) F  Local, IV N FRANKI      Name: ONEYDA,BABY1 BEAR      Apgar1: 9  Apgar5: 9   4 Term 14 41w2d 03:08 / 00:04 3.935 kg (8 lb 10.8 oz) M  None N FRANKI      Name: Muba      Apgar1: 8  Apgar5: 9   3 Term 08/10/12 40w1d 05:20 / 00:08 3.629 kg (8 lb) M  Local N FRANKI      Name: Cecilia Med      Apgar1: 9  Apgar5: 9   2 Term 11 37w0d    -SEC   FRANKI      Name: Cyndee   1 Term 04/27/10 41w0d   F    FRANKI      Name: Barrington Basilio         Objective:  /85 (BP Location: Left arm, Patient Position: Chair)   Pulse 95   Resp 18   Wt 93 kg (205 lb)   LMP 2020   SpO2 98%   BMI 33.09 kg/m      Gen: alert, oriented, NAD  Skin: warm, dry, intact  Respiratory: breathing unlabored, no SOB  Abdominal: gravid, non-tender  Pelvic: deferred  Extremities: WNL  Psych: mood WNL, behavior WNL      OB Ultrasound:  Please see \"imaging\" tab under chart review for today's ultrasound results.      Assessment/Plan:  36 year old  at 34w6d here for follow OB visit.    Pregnancy has been complicated by:   Fetal Dx:  - NTD - sacral defect S1-S2  - Bilateral ventriculomegaly R<L     Maternal Dx:  - AMA  - Hx of heart murmur - normal echo in 2010  - Hx  x4 (3x after c/s)  - Hx " of elective  in  due to 3rd deg lac, sulcus tears, and cervical lac.   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 - no issues since treatment (2017)  - Thyroid nodule - s/p right thyroid lobectomy in . No meds.      #NTD:  - Plan for NICU and peds surgery consult next week  - NICU for delivery with subsequent admission.     #Routine PNC:  - Prenatal labs:               Rh: +  antibody: neg               Hgb: 12.0 on                HepB/HIV/RPR/HepC: nonreactive               GC/CT: negative               Rubella: immune                 GCT: fasting of 74  A1c: 5.4 on  - declines GCT.               UC: mixed socorro               TSH: 1.71 on   - Immunizations: Declines Tdap   - Reviewed HSV hx at last visit. Patient has not had any lesions since initial outbreak in  and may decline HSV ppx. Recommend initiation at 36 weeks - will review again at upcoming visit.    # Surveillance:  - Weekly BPPs  - Serial growth US     #Delivery Planning:  - Patient is agreeable to IOL on NAYANA if spontaneous labor does not occur. Declines earlier in the 39th week. Scheduled for  at 0900.  - Plans for no labor analgesia  - Feeding: needs to be discussed  - Contraception plans: needs to be discussed    RTC in 1 week    15 minutes spent on the date of the encounter, doing chart review, history and exam, documentation and further activities as noted.      Sandra Laird CNM on 2021 at 2:56 PM

## 2021-07-07 NOTE — PROGRESS NOTES
Dariaphong Jaffe was seen for an ultrasound today at the Maternal-Fetal Medicine center.      For the details of the ultrasound please see the report which can be found under the imaging tab.      Jaci Johnson MD  , OB/GYN  Maternal-Fetal Medicine  [email or web address]  [phone] (Main M Office)  [phone] or [phone] (for 24 hour MFM questions)  [phone] (Pager)

## 2021-07-13 ENCOUNTER — OFFICE VISIT (OUTPATIENT)
Dept: NEUROSURGERY | Facility: CLINIC | Age: 36
End: 2021-07-13
Attending: NEUROLOGICAL SURGERY
Payer: COMMERCIAL

## 2021-07-13 DIAGNOSIS — O35.00X0 NEURAL TUBE DEFECT OF FETUS AFFECTING SINGLETON PREGNANCY: Primary | ICD-10-CM

## 2021-07-13 PROCEDURE — 99204 OFFICE O/P NEW MOD 45 MIN: CPT | Mod: GC | Performed by: NEUROLOGICAL SURGERY

## 2021-07-13 PROCEDURE — G0463 HOSPITAL OUTPT CLINIC VISIT: HCPCS

## 2021-07-13 NOTE — LETTER
2021      RE: Daria Jaffe  [address]  [facility] [address]       Pediatric Neurosurgery Clinic    Date of visit:  21    Dear Dr. Hoyt,   Thank you for referring Daria  to the pediatric neurosurgery clinic at the [facility]. I had the opportunity to meet with Daria on 21.     As you know, Daria Jaffe is a 36 year old  at 35w5d. Her baby was found to have sacral defect at S1-2 and bilateral ventriculomegaly on a prenatal ultrasound. Daria has recently relocated to the [place] from [place]. She has 5 children before and she states they are healthy without medical issues. She had an elective c section in  due to laceration and vaginal deliver in other pregnancy. She states her due date is .    There is no family history of neural tube defects. Her pregnancy has been otherwise unremarkable and she has already established care with our [facility] colleagues here and therefore the sacral defect and issues related to hydrocephalus has already been explained to her. Most of today's visit was spent going over imaging and discussing plans post delivery.        Past medical history  Past Medical History:   Diagnosis Date     Anemia     needed 2 units of blood for PPH     Herpes simplex without mention of complication     last outbreak in 2012     History of blood transfusion      Thyroid disease     thyroid nodule, normal TSH        Past surgical history  Past Surgical History:   Procedure Laterality Date     c/section[  second baby     COLONOSCOPY N/A 2016    Procedure: COLONOSCOPY;  Surgeon: Danni Andino MD;  Location:  GI     emobolization of pudendal and uterine arteries[       ESOPHAGOSCOPY, GASTROSCOPY, DUODENOSCOPY (EGD), COMBINED N/A 2016    Procedure: COMBINED ESOPHAGOSCOPY, GASTROSCOPY, DUODENOSCOPY (EGD), BIOPSY SINGLE OR MULTIPLE;  Surgeon: Stephen Gong MD;  Location:  GI     HEAD & NECK SURGERY      ORAL  SURGERY     THYROIDECTOMY Right 2015    Procedure: THYROIDECTOMY;  Surgeon: Ankush Noe MD;  Location:  OR        ALLERGIES: NKDA     Medications:   Current Outpatient Medications   Medication     acetaminophen (TYLENOL) 325 MG tablet     ibuprofen (ADVIL/MOTRIN) 200 MG tablet     omeprazole (PRILOSEC) 20 MG DR capsule     Prenatal Vit-Fe Fumarate-FA (PRENATAL VITAMINS) 28-0.8 MG TABS     Prenatal Vit-Fe Fumarate-FA (PRENATAL VITAMINS) 28-0.8 MG TABS     No current facility-administered medications for this visit.        Family history: no known history of neuro tube defect    Lives with  and 5 children, recently returned from [facility]     On review of systems, a 10 point ROS of systems including Constitutional, Eyes, Respiratory, Cardiovascular, Gastroenterology, Genitourinary, Integumentary, Muscularskeletal, Psychiatric were all negative except for pertinent positives noted in my HPI.       [facility] US on 21 was reviewed in clinic and showed suspected NTD including bilateral ventriculomegaly R>L, obliterated CM and possible small sacral defect with a meningeal sac at the level of S1 or S2, reviewed with Dr. Hopkins.     ASSESSMENT: Daria is a 36 year old female , now 35 w 5 d referred from [facility] clinic for prenatal diagnosis of sacral defect and ventriculomegaly on prenatal ultrasound. All her other children were healthy and there is no hx of NTD in the family.   Most of today's visit was spent discussing spinal dysraphism, Chiari II malformation and hydrocephalus. Importance of increased folate supplementation for future pregnancies was reinforced. Management will be determined once we are able to examine the baby and depending on  imaging and clinical assessment. Her mode of delivery and timing should follow standard obstetric indications and will be reviewed by her OB team.     PLAN:  - Neurosurgery will assess the baby after delivered, please page neurosurgery once the  baby is stable in NICU.  - We also advised the patient about the folate supplements should she desire future pregnancy  - Daria was counseled to please contact us with any acute worsening of symptoms, or with any questions or concerns.      This patient was discussed with neurosurgery faculty, who agrees with the above.    Jomar Bucio  Neurosurgery Resident      Attending Addendum:  I, Hollie Alvarado MD, saw and evaluated Daria Jaffe. I have reviewed and discussed with the resident their history, physical exam and agree with findings and plan as stated above.    I personally reviewed the vital signs, medications and imaging.    Key findings: The note above is edited to reflect my history, physical, assessment and plan and I agree with the documentation.    I discussed the course and plan with the Patient and answered all questions to the best of my ability.    50 min spent on the date of the encounter in chart review, patient visit, review of tests, documentation and/or discussion with other providers about the issues documented above.         Hollie Licona MD

## 2021-07-13 NOTE — NURSING NOTE
Chief Complaint   Patient presents with     Consult     Fetal Neural tube defect     There were no vitals filed for this visit.  Domitila Hays LPN  July 13, 2021

## 2021-07-13 NOTE — PATIENT INSTRUCTIONS
You met with Pediatric Neurosurgery at the [facility]    [address] Dr., NP      Pediatric Appointment Scheduling and Call Center:   [phone]    Nurse Practitioner  [phone]    Mailing Address  [address] [address]    Street Address   [address] [place] [id number]    Fax Number  [phone]    For urgent matters that cannot wait until the next business day, occur over a holiday and/or weekend, report directly to your nearest ER or you may call [phone] and ask to page the Pediatric Neurosurgery Resident on call.

## 2021-07-13 NOTE — PROGRESS NOTES
Pediatric Neurosurgery Clinic    Date of visit:  21    Dear Dr. Hoyt,   Thank you for referring Daria  to the pediatric neurosurgery clinic at the [facility]. I had the opportunity to meet with Daria on 21.     As you know, Daria Jaffe is a 36 year old  at 35w5d. Her baby was found to have sacral defect at S1-2 and bilateral ventriculomegaly on a prenatal ultrasound. Daria has recently [place] the [place] from [place]. She has 5 children before and she states they are healthy without medical issues. She had an elective c section in  due to laceration and vaginal deliver in other pregnancy. She states her due date is .    There is no family history of neural tube defects. Her pregnancy has been otherwise unremarkable and she has already established care with our [facility] colleagues here and therefore the sacral defect and issues related to hydrocephalus has already been explained to her. Most of today's visit was spent going over imaging and discussing plans post delivery.        Past medical history  Past Medical History:   Diagnosis Date     Anemia     needed 2 units of blood for PPH     Herpes simplex without mention of complication     last outbreak in 2012     History of blood transfusion      Thyroid disease     thyroid nodule, normal TSH        Past surgical history  Past Surgical History:   Procedure Laterality Date     c/section[  second baby     COLONOSCOPY N/A 2016    Procedure: COLONOSCOPY;  Surgeon: Danni Andino MD;  Location:  GI     emobolization of pudendal and uterine arteries[       ESOPHAGOSCOPY, GASTROSCOPY, DUODENOSCOPY (EGD), COMBINED N/A 2016    Procedure: COMBINED ESOPHAGOSCOPY, GASTROSCOPY, DUODENOSCOPY (EGD), BIOPSY SINGLE OR MULTIPLE;  Surgeon: Stephen Gong MD;  Location:  GI     HEAD & NECK SURGERY      ORAL SURGERY     THYROIDECTOMY Right 2015    Procedure: THYROIDECTOMY;  Surgeon:  Ankush Noe MD;  Location:  OR        ALLERGIES: NKDA     Medications:   Current Outpatient Medications   Medication     acetaminophen (TYLENOL) 325 MG tablet     ibuprofen (ADVIL/MOTRIN) 200 MG tablet     omeprazole (PRILOSEC) 20 MG DR capsule     Prenatal Vit-Fe Fumarate-FA (PRENATAL VITAMINS) 28-0.8 MG TABS     Prenatal Vit-Fe Fumarate-FA (PRENATAL VITAMINS) 28-0.8 MG TABS     No current facility-administered medications for this visit.        Family history: no known history of neuro tube defect    Lives with  and 5 children, recently returned from [facility]     On review of systems, a 10 point ROS of systems including Constitutional, Eyes, Respiratory, Cardiovascular, Gastroenterology, Genitourinary, Integumentary, Muscularskeletal, Psychiatric were all negative except for pertinent positives noted in my HPI.       [facility] US on 21 was reviewed in clinic and showed suspected NTD including bilateral ventriculomegaly R>L, obliterated CM and possible small sacral defect with a meningeal sac at the level of S1 or S2, reviewed with Dr. Hopkins.     ASSESSMENT: Daria is a 36 year old female , now 35 w 5 d referred from [facility] clinic for prenatal diagnosis of sacral defect and ventriculomegaly on prenatal ultrasound. All her other children were healthy and there is no hx of NTD in the family.   Most of today's visit was spent discussing spinal dysraphism, Chiari II malformation and hydrocephalus. Importance of increased folate supplementation for future pregnancies was reinforced. Management will be determined once we are able to examine the baby and depending on  imaging and clinical assessment. Her mode of delivery and timing should follow standard obstetric indications and will be reviewed by her OB team.     PLAN:  - Neurosurgery will assess the baby after delivered, please page neurosurgery once the baby is stable in NICU.  - We also advised the patient about the folate  supplements should she desire future pregnancy  - Daria was counseled to please contact us with any acute worsening of symptoms, or with any questions or concerns.      This patient was discussed with neurosurgery faculty, who agrees with the above.    Jomar Bucio  Neurosurgery Resident      Attending Addendum:  I, Hollie Alvarado MD, saw and evaluated Daria Jaffe. I have reviewed and discussed with the resident their history, physical exam and agree with findings and plan as stated above.    I personally reviewed the vital signs, medications and imaging.    Key findings: The note above is edited to reflect my history, physical, assessment and plan and I agree with the documentation.    I discussed the course and plan with the Patient and answered all questions to the best of my ability.    50 min spent on the date of the encounter in chart review, patient visit, review of tests, documentation and/or discussion with other providers about the issues documented above.

## 2021-07-14 ENCOUNTER — ALLIED HEALTH/NURSE VISIT (OUTPATIENT)
Dept: MATERNAL FETAL MEDICINE | Facility: CLINIC | Age: 36
End: 2021-07-14
Attending: OBSTETRICS & GYNECOLOGY
Payer: COMMERCIAL

## 2021-07-14 ENCOUNTER — HOSPITAL ENCOUNTER (OUTPATIENT)
Dept: ULTRASOUND IMAGING | Facility: CLINIC | Age: 36
End: 2021-07-14
Attending: OBSTETRICS & GYNECOLOGY
Payer: COMMERCIAL

## 2021-07-14 DIAGNOSIS — O35.00X0 NEURAL TUBE DEFECT OF FETUS AFFECTING SINGLETON PREGNANCY: ICD-10-CM

## 2021-07-14 DIAGNOSIS — O35.00X0 CENTRAL NERVOUS SYSTEM MALFORMATION IN FETUS AFFECTING OBSTETRICAL CARE, SINGLE OR UNSPECIFIED FETUS: ICD-10-CM

## 2021-07-14 DIAGNOSIS — O35.00X0 FETAL NEURAL TUBE DEFECT AFFECTING PREGNANCY, SINGLE OR UNSPECIFIED FETUS: Primary | ICD-10-CM

## 2021-07-14 PROCEDURE — 76819 FETAL BIOPHYS PROFIL W/O NST: CPT

## 2021-07-14 PROCEDURE — 99241 PR OFFICE CONSULTATION,LEVEL I: CPT | Performed by: PEDIATRICS

## 2021-07-14 PROCEDURE — 76819 FETAL BIOPHYS PROFIL W/O NST: CPT | Mod: 26 | Performed by: OBSTETRICS & GYNECOLOGY

## 2021-07-14 NOTE — PROGRESS NOTES
Please see the imaging tab for details of the ultrasound performed today.    Verna Rondon MD  Specialist in Maternal-Fetal Medicine

## 2021-07-14 NOTE — NURSING NOTE
HARSHAD Flores and Dr Verde met with patient for NICU consult in clinic - see notes.    Herminia Sloan RN

## 2021-07-19 NOTE — PROGRESS NOTES
NICU Consult    I had the pleasure of meeting with Ms. Daria Jaffe for  consultation in the [facility] clinic at the [facility] at the request of Dr. Jaci Johnson.  Ms. Jaffe is currently 35 weeks pregnant with a fetus affected by a neural tube defect at S1-S2 with bilateral ventriculomegaly L>R.  She has already met with our colleagues in neurosurgery.      I described the NICU resuscitation team that will be present at her delivery.  We discussed the initial stabilization process including covering the neural tube defect immediately after delivery.  We discussed the provision of respiratory support if needed.  We then discussed admission to the NICU and the placement of IV lines.  Pediatric neurosurgery will be consulted at the time of birth and assist us with any imaging required for surgical planning as well as care of the lesion itself.      I then described the ongoing collaboration with the neurosurgical team for surgery planning.  Her infant will be intubated at the time of surgery and then undergo repair.  I described the usual recovery and extubation process.  We discussed the reason an infant may need a shunt if worsening hydrocephalus.  We discussed the usual length of stay in the NICU after post op recovery.  We also reviewed the multidisciplinary team including urology, OT, and outpatient rehabilitation.      I described the layout of the NICU and the make up of the medical team.  We discussed how parents will be updated and the various layers of support including social work, OT, lactation, and chaplaincy.  We reviewed the COVID visiting restrictions.   At the completion of the visit, all questions were answered and I provided my contact information.  We look forward to caring for the infant of Ms. Daria Jaffe in the NICU at the [facility].  Please contact me if there are questions.      Tashia Verde MD  Neonatololgy    Total time of visit:  25 mins with 100% of time in direct patient counseling

## 2021-07-21 ENCOUNTER — HOSPITAL ENCOUNTER (OUTPATIENT)
Dept: ULTRASOUND IMAGING | Facility: CLINIC | Age: 36
End: 2021-07-21
Attending: OBSTETRICS & GYNECOLOGY
Payer: COMMERCIAL

## 2021-07-21 ENCOUNTER — OFFICE VISIT (OUTPATIENT)
Dept: MATERNAL FETAL MEDICINE | Facility: CLINIC | Age: 36
End: 2021-07-21
Attending: OBSTETRICS & GYNECOLOGY
Payer: COMMERCIAL

## 2021-07-21 VITALS
OXYGEN SATURATION: 99 % | BODY MASS INDEX: 32.44 KG/M2 | HEART RATE: 91 BPM | DIASTOLIC BLOOD PRESSURE: 78 MMHG | RESPIRATION RATE: 18 BRPM | SYSTOLIC BLOOD PRESSURE: 119 MMHG | WEIGHT: 201 LBS

## 2021-07-21 DIAGNOSIS — O35.00X0 NEURAL TUBE DEFECT OF FETUS AFFECTING SINGLETON PREGNANCY: ICD-10-CM

## 2021-07-21 DIAGNOSIS — O35.00X0 CENTRAL NERVOUS SYSTEM MALFORMATION IN FETUS AFFECTING OBSTETRICAL CARE, SINGLE OR UNSPECIFIED FETUS: ICD-10-CM

## 2021-07-21 PROCEDURE — 76816 OB US FOLLOW-UP PER FETUS: CPT | Mod: 26 | Performed by: OBSTETRICS & GYNECOLOGY

## 2021-07-21 PROCEDURE — 76816 OB US FOLLOW-UP PER FETUS: CPT

## 2021-07-21 PROCEDURE — 99212 OFFICE O/P EST SF 10 MIN: CPT | Mod: 25 | Performed by: ADVANCED PRACTICE MIDWIFE

## 2021-07-21 PROCEDURE — 76819 FETAL BIOPHYS PROFIL W/O NST: CPT

## 2021-07-21 PROCEDURE — G0463 HOSPITAL OUTPT CLINIC VISIT: HCPCS | Mod: 25

## 2021-07-21 PROCEDURE — 76819 FETAL BIOPHYS PROFIL W/O NST: CPT | Mod: 26 | Performed by: OBSTETRICS & GYNECOLOGY

## 2021-07-21 ASSESSMENT — PAIN SCALES - GENERAL: PAINLEVEL: NO PAIN (0)

## 2021-07-21 NOTE — PROGRESS NOTES
Maternal fetal Medicine OB Follow up visit.     Bear Jaffe  : 1985  MRN: [id number]    CC: OB Follow-up    Subjective:  Bear Jaffe is a 36 year old  at 36w6d presenting for routine OB follow-up. Today, she is feeling fatigued and weak at times, but overall well. She reports that she does not have a large appetite with this pregnancy, but is mostly just thirsty. So she will drink water and then feel full so she does not want to eat. She also is worried she will have a 9-10lb baby so she does not feel she should eat much. She reports eating 2x per day - in the morning and the evening. She eats smaller meals of eggs, greens, and bread for breakfast, and chicken and rice for dinner.     Patient denies regular, painful contractions, denies loss of fluid or vaginal bleeding.  Reports fetal movement.      Requests SVE and GBS swab next week.      OB Hx:  OB History    Para Term  AB Living   6 5 5 0 0 5   SAB TAB Ectopic Multiple Live Births   0 0 0 0 5      # Outcome Date GA Lbr Richard/2nd Weight Sex Delivery Anes PTL Lv   6 Current            5 Term 17 42w1d 03:28 / 00:05 3.82 kg (8 lb 6.8 oz) F  Local, IV N FRANKI      Name: KEVIN JAFFE1 BEAR      Apgar1: 9  Apgar5: 9   4 Term 14 41w2d 03:08 / 00:04 3.935 kg (8 lb 10.8 oz) M  None N FRANKI      Name: Muba      Apgar1: 8  Apgar5: 9   3 Term 08/10/12 40w1d 05:20 / 00:08 3.629 kg (8 lb) M  Local N FRANKI      Name: Cecilia Med      Apgar1: 9  Apgar5: 9   2 Term 11 37w0d    -SEC   FRANKI      Name: Cyndee   1 Term 04/27/10 41w0d   F    FRANKI      Name: Barrington Basilio         Objective:  /78 (BP Location: Left arm, Patient Position: Chair)   Pulse 91   Resp 18   Wt 91.2 kg (201 lb)   LMP 2020   SpO2 99%   BMI 32.44 kg/m      Gen: alert, oriented, NAD  Skin: warm, dry, intact  Respiratory: breathing unlabored, no SOB  Abdominal: gravid, non-tender  Pelvic: deferred  Extremities: WNL  Psych: mood WNL, behavior  "WNL      OB Ultrasound:  Please see \"imaging\" tab under chart review for today's ultrasound results.      Assessment/Plan:  36 year old  at 36w6d here for follow OB visit.    Pregnancy has been complicated by:   Fetal Dx:  - NTD - sacral defect S1-S2  - Bilateral ventriculomegaly R<L     Maternal Dx:  - AMA  - Hx of heart murmur - normal echo in 2010  - Hx  x4 (3x after c/s)  - Hx of elective  in  due to 3rd deg lac, sulcus tears, and cervical lac.   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 - no issues since treatment (2017)  - Thyroid nodule - s/p right thyroid lobectomy in . No meds.         #NTD:  - Plan for NICU and peds surgery consult next week  - NICU for delivery with subsequent admission.     #Routine PNC:  - Prenatal labs:               Rh: +  antibody: neg               Hgb: 12.0 on                HepB/HIV/RPR/HepC: nonreactive               GC/CT: negative               Rubella: immune                 GCT: fasting of 74  A1c: 5.4 on  - declines GCT.               UC: mixed socorro               TSH: 1.71 on   - Immunizations: Declines Tdap   - Reviewed HSV hx and recommend initiation of medications now, but patient wishes to think about this further and will make a decision at her next visit. Was not on suppressive ppx with past pregnancies.   - Discussed importance of eating regular meals in pregnancy. Recommend addition of Ensure drinks if patient isn't feeling urge to eat. Declines prescription for this, but states she will pick some up today.     # Surveillance:  - Weekly BPPs  - Serial growth US     #Delivery Planning:  - Patient is agreeable to IOL on NAYANA if spontaneous labor does not occur. Declines earlier in the 39th week. Scheduled for  at 0900.  - Plans for fentanyl while in labor.  - Feeding: needs to be discussed  - Contraception plans: needs to be discussed      RTC in 1 week    15 minutes spent on the date of the " encounter, doing chart review, history and exam, documentation and further activities as noted.      Sandra Laird CNM on 7/21/2021 at 12:17 PM

## 2021-07-21 NOTE — NURSING NOTE
"Daria seen in clinic today for follow up growth US/BPP and prenatal visit at 36w6d gestation due to pregnancy c/b fetal NTD (see report/notes). VSS. Pt reports + FM. Pt denies bldg/lof/change in discharge/contractions/headache/vision changes/chest pain/SOB/edema. Daria states she has been feeling \"weak\" the past couple of days. Weight is down 4 lbs over 2 weeks. Pt states she drinks lots of water but not eating much because she doesn't want to feel full or have a big baby. Encouraged patient to eat healthy foods in maintain nutrition and energy. Dr. Hoyt met with patient and reviewed US. Sandra Laird CNM met with pt and discussed POC. Plan: return in 1 week for BPP/OB visit, GBS. Pt discharged stable and ambulatory.     Jaci Houser RN    "

## 2021-07-27 ENCOUNTER — DOCUMENTATION ONLY (OUTPATIENT)
Dept: CARE COORDINATION | Facility: CLINIC | Age: 36
End: 2021-07-27

## 2021-07-27 NOTE — PROGRESS NOTES
SW called pt to check in. She is feeling good overall. She prefers to wait to apply for WIC once the baby comes. SW reminded her she can apply during pregnancy as well. She has no questions or concerns at this time.     MINDA Johnson, [facility]  Maternal and Child Health   Office: [phone]  [email or web address], [email or web address]

## 2021-07-28 ENCOUNTER — OFFICE VISIT (OUTPATIENT)
Dept: MATERNAL FETAL MEDICINE | Facility: CLINIC | Age: 36
End: 2021-07-28
Attending: OBSTETRICS & GYNECOLOGY
Payer: COMMERCIAL

## 2021-07-28 ENCOUNTER — OFFICE VISIT (OUTPATIENT)
Dept: MATERNAL FETAL MEDICINE | Facility: CLINIC | Age: 36
End: 2021-07-28
Attending: ADVANCED PRACTICE MIDWIFE
Payer: COMMERCIAL

## 2021-07-28 ENCOUNTER — HOSPITAL ENCOUNTER (OUTPATIENT)
Dept: ULTRASOUND IMAGING | Facility: CLINIC | Age: 36
End: 2021-07-28
Attending: ADVANCED PRACTICE MIDWIFE
Payer: COMMERCIAL

## 2021-07-28 VITALS
BODY MASS INDEX: 32.44 KG/M2 | WEIGHT: 201 LBS | DIASTOLIC BLOOD PRESSURE: 59 MMHG | RESPIRATION RATE: 18 BRPM | HEART RATE: 80 BPM | SYSTOLIC BLOOD PRESSURE: 120 MMHG | OXYGEN SATURATION: 98 %

## 2021-07-28 DIAGNOSIS — O09.93 SUPERVISION OF HIGH RISK PREGNANCY IN THIRD TRIMESTER: Primary | ICD-10-CM

## 2021-07-28 DIAGNOSIS — O35.00X0 NEURAL TUBE DEFECT OF FETUS AFFECTING SINGLETON PREGNANCY: ICD-10-CM

## 2021-07-28 DIAGNOSIS — O35.00X0 NEURAL TUBE DEFECT OF FETUS AFFECTING SINGLETON PREGNANCY: Primary | ICD-10-CM

## 2021-07-28 DIAGNOSIS — N89.8 VAGINAL ITCHING: ICD-10-CM

## 2021-07-28 LAB
CLUE CELLS: ABNORMAL
TRICHOMONAS, WET PREP: ABNORMAL
WBC'S/HIGH POWER FIELD, WET PREP: ABNORMAL
YEAST, WET PREP: PRESENT

## 2021-07-28 PROCEDURE — 76819 FETAL BIOPHYS PROFIL W/O NST: CPT

## 2021-07-28 PROCEDURE — 87210 SMEAR WET MOUNT SALINE/INK: CPT | Performed by: ADVANCED PRACTICE MIDWIFE

## 2021-07-28 PROCEDURE — 76819 FETAL BIOPHYS PROFIL W/O NST: CPT | Mod: 26 | Performed by: OBSTETRICS & GYNECOLOGY

## 2021-07-28 PROCEDURE — 87653 STREP B DNA AMP PROBE: CPT | Performed by: ADVANCED PRACTICE MIDWIFE

## 2021-07-28 PROCEDURE — G0463 HOSPITAL OUTPT CLINIC VISIT: HCPCS | Mod: 25

## 2021-07-28 PROCEDURE — 99212 OFFICE O/P EST SF 10 MIN: CPT | Mod: 25 | Performed by: ADVANCED PRACTICE MIDWIFE

## 2021-07-28 RX ORDER — CLOTRIMAZOLE 1 %
1 CREAM WITH APPLICATOR VAGINAL AT BEDTIME
Qty: 1 G | Refills: 0 | Status: SHIPPED | OUTPATIENT
Start: 2021-07-28 | End: 2021-08-04

## 2021-07-28 ASSESSMENT — PAIN SCALES - GENERAL: PAINLEVEL: NO PAIN (0)

## 2021-07-28 NOTE — NURSING NOTE
Daria seen in clinic today for BPP/OB visit at 37w6d gestation due to pregnancy c/b fetal NTD (see report/notes). VSS. Pt reports + FM. Pt denies bldg/lof/change in discharge/contractions/headache/vision changes/chest pain/SOB/edema. Sandra Laird CNM met with pt and discussed POC. GBS, wet prep, and SVE per KJ (see notes). Pt scheduled to return in 1 week.  Pt discharged stable and ambulatory.     Jaci Houser RN

## 2021-07-28 NOTE — PROGRESS NOTES
Maternal fetal Medicine OB Follow up visit.     Daria Jaffe  : 1985  MRN: [id number]    CC: OB Follow-up    Subjective:  Daria Jaffe is a 36 year old  at 37w6d presenting for routine OB follow-up. Today, she is feeling better than last week. Reports her appetite has improved and she is not as fatigued. She was able to purchase Ensure meal enhancement drinks and reports that has been helpful.     Occasional BH contractions with activity. Normal FM, no VB, or LOF, but does think she has a yeast infection. Endorses thick, white discharge and vaginal itching. She did try an over the counter yeast cream for 3 days, but still having symptoms. Would like cervical exam and membrane sweep if possible.      OB Hx:  OB History    Para Term  AB Living   6 5 5 0 0 5   SAB TAB Ectopic Multiple Live Births   0 0 0 0 5      # Outcome Date GA Lbr Richard/2nd Weight Sex Delivery Anes PTL Lv   6 Current            5 Term 17 42w1d 03:28 / 00:05 3.82 kg (8 lb 6.8 oz) F  Local, IV N FRANKI      Name: ONEYDA,JARET SIFUENTES      Apgar1: 9  Apgar5: 9   4 Term 14 41w2d 03:08 / 00:04 3.935 kg (8 lb 10.8 oz) M  None N FRANKI      Name: Muba      Apgar1: 8  Apgar5: 9   3 Term 08/10/12 40w1d 05:20 / 00:08 3.629 kg (8 lb) M  Local N FRANKI      Name: Cecilia Med      Apgar1: 9  Apgar5: 9   2 Term 11 37w0d    -SEC   FRANKI      Name: Cyndee   1 Term 04/27/10 41w0d   F    FRANKI      Name: Barrington TRENTparul         Objective:  /59 (BP Location: Left arm, Patient Position: Chair)   Pulse 80   Resp 18   Wt 91.2 kg (201 lb)   LMP 2020   SpO2 98%   BMI 32.44 kg/m      Gen: alert, oriented, NAD  Skin: warm, dry, intact  Respiratory: breathing unlabored, no SOB  Abdominal: gravid, non-tender  Pelvic: thick, white discharge noted at introitus. Tissue with moderate erythema. SVE: 0-1/thick/high, soft and posterior. Attempted membrane sweep, but unable to get all the way through internal os.   Extremities:  "WNL  Psych: mood WNL, behavior WNL      OB Ultrasound:  Please see \"imaging\" tab under chart review for today's ultrasound results.      Assessment/Plan:  36 year old  at 37w6d here for follow OB visit.    Pregnancy has been complicated by:   Fetal Dx:  - NTD - sacral defect S1-S2  - Bilateral ventriculomegaly R<L     Maternal Dx:  - AMA  - Hx of heart murmur - normal echo in 2010  - Hx  x4 (3x after c/s)  - Hx of elective  in  due to 3rd deg lac, sulcus tears, and cervical lac.   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 - no issues since treatment (2017)  - Thyroid nodule - s/p right thyroid lobectomy in . No meds.           #NTD:  - Plan for NICU and peds surgery consult next week  - NICU for delivery with subsequent admission.     #Routine PNC:  - Prenatal labs:               Rh: +  antibody: neg               Hgb: 12.0 on                HepB/HIV/RPR/HepC: nonreactive               GC/CT: negative               Rubella: immune                 GCT: fasting of 74  A1c: 5.4 on  - declines GCT.               UC: mixed socorro               TSH: 1.71 on   - Immunizations: Declines Tdap   - Declines HSV ppx. Will ensure bright light exam upon admission  - Wet prep: pending. However, based on clinical exam and symptoms will plan to treat with clotrimazole 1% vaginal cream x7 days.  - GBS collected today    # Surveillance:  - Weekly BPPs  - Serial growth US     #Delivery Planning:  - Patient is agreeable to IOL on NAYANA if spontaneous labor does not occur. Declines earlier in the 39th week. Scheduled for  at 0900.  - Plans for fentanyl while in labor.  - Feeding: needs to be discussed  - Contraception plans: needs to be discussed    RTC in 1 week    15 minutes spent on the date of the encounter, doing chart review, history and exam, documentation and further activities as noted.      Sandra Laird CNM on 2021 at 3:42 PM      "

## 2021-07-28 NOTE — PROGRESS NOTES
"Please see \"Imaging\" tab under \"Chart Review\" for details of today's US.    Melissa Ardon, DO    "

## 2021-07-29 LAB
GP B STREP DNA SPEC QL NAA+PROBE: POSITIVE
PATIENT PENICILLIN, AMOXICILLIN, CEPHALOSPORINS ALLERGY: NO

## 2021-08-04 ENCOUNTER — OFFICE VISIT (OUTPATIENT)
Dept: MATERNAL FETAL MEDICINE | Facility: CLINIC | Age: 36
End: 2021-08-04
Attending: ADVANCED PRACTICE MIDWIFE
Payer: COMMERCIAL

## 2021-08-04 ENCOUNTER — HOSPITAL ENCOUNTER (OUTPATIENT)
Dept: ULTRASOUND IMAGING | Facility: CLINIC | Age: 36
End: 2021-08-04
Attending: ADVANCED PRACTICE MIDWIFE
Payer: COMMERCIAL

## 2021-08-04 ENCOUNTER — OFFICE VISIT (OUTPATIENT)
Dept: MATERNAL FETAL MEDICINE | Facility: CLINIC | Age: 36
End: 2021-08-04
Attending: OBSTETRICS & GYNECOLOGY
Payer: COMMERCIAL

## 2021-08-04 VITALS
OXYGEN SATURATION: 99 % | BODY MASS INDEX: 33.09 KG/M2 | DIASTOLIC BLOOD PRESSURE: 65 MMHG | HEART RATE: 94 BPM | RESPIRATION RATE: 18 BRPM | WEIGHT: 205 LBS | SYSTOLIC BLOOD PRESSURE: 120 MMHG

## 2021-08-04 DIAGNOSIS — O35.00X0 NEURAL TUBE DEFECT OF FETUS AFFECTING SINGLETON PREGNANCY: Primary | ICD-10-CM

## 2021-08-04 DIAGNOSIS — O35.00X0 NEURAL TUBE DEFECT OF FETUS AFFECTING SINGLETON PREGNANCY: ICD-10-CM

## 2021-08-04 DIAGNOSIS — O09.93 SUPERVISION OF HIGH RISK PREGNANCY IN THIRD TRIMESTER: Primary | ICD-10-CM

## 2021-08-04 PROCEDURE — 76819 FETAL BIOPHYS PROFIL W/O NST: CPT

## 2021-08-04 PROCEDURE — 99212 OFFICE O/P EST SF 10 MIN: CPT | Mod: 25 | Performed by: ADVANCED PRACTICE MIDWIFE

## 2021-08-04 PROCEDURE — 76819 FETAL BIOPHYS PROFIL W/O NST: CPT | Mod: 26 | Performed by: OBSTETRICS & GYNECOLOGY

## 2021-08-04 PROCEDURE — G0463 HOSPITAL OUTPT CLINIC VISIT: HCPCS | Mod: 25

## 2021-08-04 ASSESSMENT — PAIN SCALES - GENERAL: PAINLEVEL: NO PAIN (0)

## 2021-08-04 NOTE — PROGRESS NOTES
"Please see \"Imaging\" tab under \"Chart Review\" for details of today's [facility].    Delbert Santos MD  Maternal-Fetal Medicine      "

## 2021-08-04 NOTE — NURSING NOTE
Daria seen in clinic today for BPP and prenatal visit at 38w6d gestation due to pregnancy c/b fetal NTD (see report/notes). VSS. Pt reports + FM. Pt denies bldg/lof/change in discharge/contractions/headache/vision changes/chest pain/SOB/edema. Sandra Laird CNM met with pt and discussed POC. Plan to return to [facility] in one week for BPP, OB visit, covid swab. IOL scheduled for 8/12 at 0900. Instruction sheet given. Pt aware to call L&D prior to arrival. Pt discharged stable and ambulatory.       Jaci Houser RN

## 2021-08-04 NOTE — PROGRESS NOTES
"Maternal fetal Medicine OB Follow up visit.     Daria Jaffe  : 1985  MRN: [id number]    CC: OB Follow-up    Subjective:  Daria Jaffe is a 36 year old  at 38w6d presenting for routine OB follow-up. Today, she is feeling well, ready to be done. Desires SVE and possible membrane sweep af able. Has occasional contractions, but nothing consistent. Reports normal fetal movement.       OB Hx:  OB History    Para Term  AB Living   6 5 5 0 0 5   SAB TAB Ectopic Multiple Live Births   0 0 0 0 5      # Outcome Date GA Lbr Richard/2nd Weight Sex Delivery Anes PTL Lv   6 Current            5 Term 17 42w1d 03:28 / 00:05 3.82 kg (8 lb 6.8 oz) F  Local, IV N FRANKI      Name: ONEYDABABYFarzad SIFUENTES      Apgar1: 9  Apgar5: 9   4 Term 14 41w2d 03:08 / 00:04 3.935 kg (8 lb 10.8 oz) M  None N FRANKI      Name: Muba      Apgar1: 8  Apgar5: 9   3 Term 08/10/12 40w1d 05:20 / 00:08 3.629 kg (8 lb) M  Local N FRANKI      Name: Cecilia Med      Apgar1: 9  Apgar5: 9   2 Term 11 37w0d    -SEC   FRANKI      Name: Cyndee   1 Term 04/27/10 41w0d   F    FRANKI      Name: Barrington Basilio         Objective:  /65 (BP Location: Left arm, Patient Position: Chair)   Pulse 94   Resp 18   Wt 93 kg (205 lb)   LMP 2020   SpO2 99%   BMI 33.09 kg/m      Gen: alert, oriented, NAD  Skin: warm, dry, intact  Respiratory: breathing unlabored, no SOB  Abdominal: gravid, non-tender  Pelvic: SVE: /-4  Extremities: WNL  Psych: mood WNl, behavior WNL      OB Ultrasound:  Please see \"imaging\" tab under chart review for today's ultrasound results.      Assessment/Plan:  36 year old  at 38w6d here for follow OB visit.    Pregnancy has been complicated by:   Fetal Dx:  - NTD - sacral defect S1-S2  - Bilateral ventriculomegaly R<L     Maternal Dx:  - AMA  - Hx of heart murmur - normal echo in 2010  - Hx  x4 (3x after c/s)  - Hx of elective  in  due to 3rd deg lac, sulcus tears, and cervical lac. 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 - no issues since treatment (2017)  - Thyroid nodule - s/p right thyroid lobectomy in . No meds.           #NTD:  - Plan for NICU and peds surgery consult next week  - NICU for delivery with subsequent admission.     #Routine PNC:  - Prenatal labs:               Rh: +  antibody: neg               Hgb: 12.0 on                HepB/HIV/RPR/HepC: nonreactive               GC/CT: negative               Rubella: immune                 GCT: fasting of 74  A1c: 5.4 on  - declines GCT.               UC: mixed socorro               TSH: 1.71 on   - Immunizations: Declines Tdap   - Declines HSV ppx. Will ensure bright light exam upon admission  - GBS positive     # Surveillance:  - Weekly BPPs  - Serial growth US     #Delivery Planning:  - Patient is agreeable to IOL on NAYANA if spontaneous labor does not occur. Scheduled for  at 0900.  - Plans for fentanyl while in labor if needed  - Feeding: breastfeeding  - Contraception plans: needs to be discussed      RTC in 1 week    15 minutes spent on the date of the encounter, doing chart review, history and exam, documentation and further activities as noted.      Sandra Laird CNM on 2021 at 3:37 PM

## 2021-08-09 NOTE — PROGRESS NOTES
"Maternal fetal Medicine OB Follow up visit.     Daria Jaffe  : 1985  MRN: [id number]    CC: OB Follow-up    Subjective:  Daria Jaffe is a 36 year old  at 39w5d presenting for routine OB follow-up. Today, she is feeling well, but does have a sore right ear. Having irregular contractions with activity, but resolves with rest. Reports normal fetal movement. No loss of fluid or bleeding. Desires SVE.       OB Hx:  OB History    Para Term  AB Living   6 5 5 0 0 5   SAB TAB Ectopic Multiple Live Births   0 0 0 0 5      # Outcome Date GA Lbr Richard/2nd Weight Sex Delivery Anes PTL Lv   6 Current            5 Term 17 42w1d 03:28 / 00:05 3.82 kg (8 lb 6.8 oz) F  Local, IV N FRANKI      Name: JARET JAFFE      Apgar1: 9  Apgar5: 9   4 Term 14 41w2d 03:08 / 00:04 3.935 kg (8 lb 10.8 oz) M  None N FRANKI      Name: Muba      Apgar1: 8  Apgar5: 9   3 Term 08/10/12 40w1d 05:20 / 00:08 3.629 kg (8 lb) M  Local N FRANKI      Name: Cecilia Med      Apgar1: 9  Apgar5: 9   2 Term 11 37w0d    -SEC   FRANKI      Name: Cyndee   1 Term 04/27/10 41w0d   F    FRANKI      Name: Barrington Basilio         Objective:  /77 (BP Location: Left arm, Patient Position: Sitting, Cuff Size: Adult Large)   Pulse 85   Wt 92.5 kg (204 lb)   LMP 2020   SpO2 100%   BMI 32.93 kg/m      Gen: alert, oriented, NAD  Skin: warm, dry, intact  Respiratory: breathing unlabored, no SOB  Abdominal: gravid, non-tender  Pelvic: no vaginal lesions noted. SVE: /3   Extremities: WNL  Psych: mood WNL, behavior WNL      OB Ultrasound:  Please see \"imaging\" tab under chart review for today's ultrasound results.      Assessment/Plan:  36 year old  at 39w5d here for follow OB visit.    Pregnancy has been complicated by:   Fetal Dx:  - NTD - sacral defect S1-S2  - Bilateral ventriculomegaly R<L     Maternal Dx:  - AMA  - Hx of heart murmur - normal echo in 2010  - Hx  x4 (3x after c/s)  - Hx of elective "  in  due to 3rd deg lac, sulcus tears, and cervical lac.   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 - no issues since treatment (2017)  - Thyroid nodule - s/p right thyroid lobectomy in . No meds.           #NTD:  - S/p NICU and peds surgical consults  - NICU for delivery with subsequent admission.     #Routine PNC:  - Prenatal labs:               Rh: +  antibody: neg               Hgb: 12.0 on                HepB/HIV/RPR/HepC: nonreactive               GC/CT: negative               Rubella: immune                 GCT: fasting of 74  A1c: 5.4 on  - declines GCT.               UC: mixed socorro               TSH: 1.71 on   - Immunizations: Declines Tdap   - Declines HSV ppx. Will ensure bright light exam upon admission  - GBS positive     # Surveillance:  - Weekly BPPs  - Serial growth US     #Delivery Planning:  - Patient is agreeable to IOL on NAYANA if spontaneous labor does not occur. Scheduled for  at 0900. Covid swab collected today.  - Plans for fentanyl while in labor if needed  - Feeding: breastfeeding  - Contraception plans: condoms        15 minutes spent on the date of the encounter, doing chart review, history and exam, documentation and further activities as noted.      Sandra Laird CNM on 2021 at 1:17 PM

## 2021-08-10 ENCOUNTER — OFFICE VISIT (OUTPATIENT)
Dept: MATERNAL FETAL MEDICINE | Facility: CLINIC | Age: 36
End: 2021-08-10
Attending: ADVANCED PRACTICE MIDWIFE
Payer: COMMERCIAL

## 2021-08-10 ENCOUNTER — HOSPITAL ENCOUNTER (OUTPATIENT)
Dept: ULTRASOUND IMAGING | Facility: CLINIC | Age: 36
End: 2021-08-10
Attending: ADVANCED PRACTICE MIDWIFE
Payer: COMMERCIAL

## 2021-08-10 VITALS
SYSTOLIC BLOOD PRESSURE: 131 MMHG | BODY MASS INDEX: 32.93 KG/M2 | OXYGEN SATURATION: 100 % | DIASTOLIC BLOOD PRESSURE: 77 MMHG | HEART RATE: 85 BPM | WEIGHT: 204 LBS

## 2021-08-10 DIAGNOSIS — O09.93 SUPERVISION OF HIGH RISK PREGNANCY IN THIRD TRIMESTER: Primary | ICD-10-CM

## 2021-08-10 DIAGNOSIS — O35.00X0 NEURAL TUBE DEFECT OF FETUS AFFECTING SINGLETON PREGNANCY: ICD-10-CM

## 2021-08-10 DIAGNOSIS — O35.00X0 NEURAL TUBE DEFECT OF FETUS AFFECTING SINGLETON PREGNANCY: Primary | ICD-10-CM

## 2021-08-10 LAB — SARS-COV-2 RNA RESP QL NAA+PROBE: NEGATIVE

## 2021-08-10 PROCEDURE — 87635 SARS-COV-2 COVID-19 AMP PRB: CPT | Performed by: ADVANCED PRACTICE MIDWIFE

## 2021-08-10 PROCEDURE — G0463 HOSPITAL OUTPT CLINIC VISIT: HCPCS | Mod: 25

## 2021-08-10 PROCEDURE — 99212 OFFICE O/P EST SF 10 MIN: CPT | Mod: 25 | Performed by: ADVANCED PRACTICE MIDWIFE

## 2021-08-10 PROCEDURE — 76819 FETAL BIOPHYS PROFIL W/O NST: CPT

## 2021-08-10 PROCEDURE — 76819 FETAL BIOPHYS PROFIL W/O NST: CPT | Mod: 26 | Performed by: OBSTETRICS & GYNECOLOGY

## 2021-08-10 NOTE — PROGRESS NOTES
"Please see \"Imaging\" tab under \"Chart Review\" for details of today's visit.    Annie Bains    "

## 2021-08-10 NOTE — PROGRESS NOTES
Pt presents to [facility] for assessment and evaluation of her pregnancy due to baby with NTD. Pt states she is doing well. Pt offers no complaints at this time. Pt states +fm, no lof or bleeding. No contractions. Us done and reviewed by Dr. Bains. See [facility] for today's findings. Pt had obv done with Sandra Laird CNM. See flow sheets and note. Pt had cervix checked and covid swab done today. Has IOL set for 8/12 at 9am. Knows when to come in or call if she has further questions. Discharged stable at this time. Lashaun Hodge RN

## 2021-08-11 ENCOUNTER — TELEPHONE (OUTPATIENT)
Dept: MATERNAL FETAL MEDICINE | Facility: CLINIC | Age: 36
End: 2021-08-11

## 2021-08-11 NOTE — TELEPHONE ENCOUNTER
Phone call to Daria with neg covid test results. Pt scheduled for IOL on 8/12.      Jaci Houser RN

## 2021-08-12 ENCOUNTER — HOSPITAL ENCOUNTER (INPATIENT)
Facility: CLINIC | Age: 36
LOS: 2 days | Discharge: HOME | End: 2021-08-14
Attending: MIDWIFE | Admitting: MIDWIFE
Payer: COMMERCIAL

## 2021-08-12 PROBLEM — Z34.90 ENCOUNTER FOR INDUCTION OF LABOR: Status: ACTIVE | Noted: 2021-08-12

## 2021-08-12 LAB
ABO/RH(D): NORMAL
ANTIBODY SCREEN: NEGATIVE
BASOPHILS # BLD AUTO: 0 10E3/UL (ref 0–0.2)
BASOPHILS NFR BLD AUTO: 0 %
EOSINOPHIL # BLD AUTO: 0.1 10E3/UL (ref 0–0.7)
EOSINOPHIL NFR BLD AUTO: 2 %
ERYTHROCYTE [DISTWIDTH] IN BLOOD BY AUTOMATED COUNT: 15.8 % (ref 10–15)
HCT VFR BLD AUTO: 33.2 % (ref 35–47)
HGB BLD-MCNC: 11 G/DL (ref 11.7–15.7)
IMM GRANULOCYTES # BLD: 0.1 10E3/UL
IMM GRANULOCYTES NFR BLD: 1 %
LYMPHOCYTES # BLD AUTO: 1.6 10E3/UL (ref 0.8–5.3)
LYMPHOCYTES NFR BLD AUTO: 23 %
MCH RBC QN AUTO: 29.6 PG (ref 26.5–33)
MCHC RBC AUTO-ENTMCNC: 33.1 G/DL (ref 31.5–36.5)
MCV RBC AUTO: 90 FL (ref 78–100)
MONOCYTES # BLD AUTO: 1 10E3/UL (ref 0–1.3)
MONOCYTES NFR BLD AUTO: 14 %
NEUTROPHILS # BLD AUTO: 4.2 10E3/UL (ref 1.6–8.3)
NEUTROPHILS NFR BLD AUTO: 60 %
NRBC # BLD AUTO: 0 10E3/UL
NRBC BLD AUTO-RTO: 0 /100
PLATELET # BLD AUTO: 120 10E3/UL (ref 150–450)
RBC # BLD AUTO: 3.71 10E6/UL (ref 3.8–5.2)
SPECIMEN EXPIRATION DATE: NORMAL
T PALLIDUM AB SER QL: NONREACTIVE
WBC # BLD AUTO: 7 10E3/UL (ref 4–11)

## 2021-08-12 PROCEDURE — 250N000011 HC RX IP 250 OP 636: Performed by: MIDWIFE

## 2021-08-12 PROCEDURE — 86780 TREPONEMA PALLIDUM: CPT | Performed by: MIDWIFE

## 2021-08-12 PROCEDURE — 258N000003 HC RX IP 258 OP 636: Performed by: MIDWIFE

## 2021-08-12 PROCEDURE — 3E033VJ INTRODUCTION OF OTHER HORMONE INTO PERIPHERAL VEIN, PERCUTANEOUS APPROACH: ICD-10-PCS | Performed by: MIDWIFE

## 2021-08-12 PROCEDURE — 120N000002 HC R&B MED SURG/OB UMMC

## 2021-08-12 PROCEDURE — 86900 BLOOD TYPING SEROLOGIC ABO: CPT | Performed by: MIDWIFE

## 2021-08-12 PROCEDURE — 250N000009 HC RX 250: Performed by: MIDWIFE

## 2021-08-12 PROCEDURE — 10907ZC DRAINAGE OF AMNIOTIC FLUID, THERAPEUTIC FROM PRODUCTS OF CONCEPTION, VIA NATURAL OR ARTIFICIAL OPENING: ICD-10-PCS | Performed by: ADVANCED PRACTICE MIDWIFE

## 2021-08-12 PROCEDURE — 85004 AUTOMATED DIFF WBC COUNT: CPT | Performed by: MIDWIFE

## 2021-08-12 RX ORDER — MISOPROSTOL 200 UG/1
400 TABLET ORAL
Status: DISCONTINUED | OUTPATIENT
Start: 2021-08-12 | End: 2021-08-13

## 2021-08-12 RX ORDER — TRANEXAMIC ACID 10 MG/ML
1 INJECTION, SOLUTION INTRAVENOUS EVERY 30 MIN PRN
Status: DISCONTINUED | OUTPATIENT
Start: 2021-08-12 | End: 2021-08-13

## 2021-08-12 RX ORDER — OXYTOCIN/0.9 % SODIUM CHLORIDE 30/500 ML
100-340 PLASTIC BAG, INJECTION (ML) INTRAVENOUS CONTINUOUS PRN
Status: DISCONTINUED | OUTPATIENT
Start: 2021-08-12 | End: 2021-08-13

## 2021-08-12 RX ORDER — MISOPROSTOL 200 UG/1
800 TABLET ORAL
Status: DISCONTINUED | OUTPATIENT
Start: 2021-08-12 | End: 2021-08-13

## 2021-08-12 RX ORDER — OXYTOCIN/0.9 % SODIUM CHLORIDE 30/500 ML
1-24 PLASTIC BAG, INJECTION (ML) INTRAVENOUS CONTINUOUS
Status: DISCONTINUED | OUTPATIENT
Start: 2021-08-12 | End: 2021-08-13

## 2021-08-12 RX ORDER — METHYLERGONOVINE MALEATE 0.2 MG/ML
200 INJECTION INTRAVENOUS
Status: DISCONTINUED | OUTPATIENT
Start: 2021-08-12 | End: 2021-08-13

## 2021-08-12 RX ORDER — KETOROLAC TROMETHAMINE 30 MG/ML
30 INJECTION, SOLUTION INTRAMUSCULAR; INTRAVENOUS
Status: DISCONTINUED | OUTPATIENT
Start: 2021-08-12 | End: 2021-08-13

## 2021-08-12 RX ORDER — OXYTOCIN 10 [USP'U]/ML
10 INJECTION, SOLUTION INTRAMUSCULAR; INTRAVENOUS
Status: DISCONTINUED | OUTPATIENT
Start: 2021-08-12 | End: 2021-08-13

## 2021-08-12 RX ORDER — NALOXONE HYDROCHLORIDE 0.4 MG/ML
0.2 INJECTION, SOLUTION INTRAMUSCULAR; INTRAVENOUS; SUBCUTANEOUS
Status: DISCONTINUED | OUTPATIENT
Start: 2021-08-12 | End: 2021-08-13

## 2021-08-12 RX ORDER — ONDANSETRON 4 MG/1
4 TABLET, ORALLY DISINTEGRATING ORAL EVERY 6 HOURS PRN
Status: DISCONTINUED | OUTPATIENT
Start: 2021-08-12 | End: 2021-08-13

## 2021-08-12 RX ORDER — PROCHLORPERAZINE MALEATE 10 MG
10 TABLET ORAL EVERY 6 HOURS PRN
Status: DISCONTINUED | OUTPATIENT
Start: 2021-08-12 | End: 2021-08-13

## 2021-08-12 RX ORDER — METOCLOPRAMIDE HYDROCHLORIDE 5 MG/ML
10 INJECTION INTRAMUSCULAR; INTRAVENOUS EVERY 6 HOURS PRN
Status: DISCONTINUED | OUTPATIENT
Start: 2021-08-12 | End: 2021-08-13

## 2021-08-12 RX ORDER — OXYTOCIN/0.9 % SODIUM CHLORIDE 30/500 ML
340 PLASTIC BAG, INJECTION (ML) INTRAVENOUS CONTINUOUS PRN
Status: DISCONTINUED | OUTPATIENT
Start: 2021-08-12 | End: 2021-08-13

## 2021-08-12 RX ORDER — NALOXONE HYDROCHLORIDE 0.4 MG/ML
0.4 INJECTION, SOLUTION INTRAMUSCULAR; INTRAVENOUS; SUBCUTANEOUS
Status: DISCONTINUED | OUTPATIENT
Start: 2021-08-12 | End: 2021-08-13

## 2021-08-12 RX ORDER — LIDOCAINE 40 MG/G
CREAM TOPICAL
Status: DISCONTINUED | OUTPATIENT
Start: 2021-08-12 | End: 2021-08-13

## 2021-08-12 RX ORDER — PENICILLIN G POTASSIUM 5000000 [IU]/1
5 INJECTION, POWDER, FOR SOLUTION INTRAMUSCULAR; INTRAVENOUS ONCE
Status: COMPLETED | OUTPATIENT
Start: 2021-08-12 | End: 2021-08-12

## 2021-08-12 RX ORDER — CARBOPROST TROMETHAMINE 250 UG/ML
250 INJECTION, SOLUTION INTRAMUSCULAR
Status: DISCONTINUED | OUTPATIENT
Start: 2021-08-12 | End: 2021-08-13

## 2021-08-12 RX ORDER — ONDANSETRON 2 MG/ML
4 INJECTION INTRAMUSCULAR; INTRAVENOUS EVERY 6 HOURS PRN
Status: DISCONTINUED | OUTPATIENT
Start: 2021-08-12 | End: 2021-08-13

## 2021-08-12 RX ORDER — PROCHLORPERAZINE 25 MG
25 SUPPOSITORY, RECTAL RECTAL EVERY 12 HOURS PRN
Status: DISCONTINUED | OUTPATIENT
Start: 2021-08-12 | End: 2021-08-13

## 2021-08-12 RX ORDER — FENTANYL CITRATE 50 UG/ML
50-100 INJECTION, SOLUTION INTRAMUSCULAR; INTRAVENOUS
Status: DISCONTINUED | OUTPATIENT
Start: 2021-08-12 | End: 2021-08-13

## 2021-08-12 RX ORDER — METOCLOPRAMIDE 10 MG/1
10 TABLET ORAL EVERY 6 HOURS PRN
Status: DISCONTINUED | OUTPATIENT
Start: 2021-08-12 | End: 2021-08-13

## 2021-08-12 RX ORDER — IBUPROFEN 600 MG/1
600 TABLET, FILM COATED ORAL
Status: DISCONTINUED | OUTPATIENT
Start: 2021-08-12 | End: 2021-08-13

## 2021-08-12 RX ADMIN — Medication 4 MILLI-UNITS/MIN: at 18:21

## 2021-08-12 RX ADMIN — PENICILLIN G POTASSIUM 5 MILLION UNITS: 5000000 POWDER, FOR SOLUTION INTRAMUSCULAR; INTRAPLEURAL; INTRATHECAL; INTRAVENOUS at 18:30

## 2021-08-12 RX ADMIN — SODIUM CHLORIDE 3 MILLION UNITS: 9 INJECTION, SOLUTION INTRAVENOUS at 22:25

## 2021-08-12 RX ADMIN — FENTANYL CITRATE 100 MCG: 50 INJECTION INTRAMUSCULAR; INTRAVENOUS at 23:20

## 2021-08-12 RX ADMIN — SODIUM CHLORIDE, POTASSIUM CHLORIDE, SODIUM LACTATE AND CALCIUM CHLORIDE 1000 ML: 600; 310; 30; 20 INJECTION, SOLUTION INTRAVENOUS at 16:46

## 2021-08-12 RX ADMIN — Medication 2 MILLI-UNITS/MIN: at 16:46

## 2021-08-12 ASSESSMENT — MIFFLIN-ST. JEOR: SCORE: 1613.94

## 2021-08-12 NOTE — PROGRESS NOTES
"Blood pressure 128/74, temperature 98.3  F (36.8  C), temperature source Oral, resp. rate 18, height 1.676 m (5' 6\"), weight 90.7 kg (200 lb), last menstrual period 2020, unknown if currently breastfeeding.  Patient Vitals for the past 24 hrs:   BP Temp Temp src Resp Height Weight   21 0934 -- -- -- -- 1.676 m (5' 6\") 90.7 kg (200 lb)   21 0921 128/74 98.3  F (36.8  C) Oral 18 -- --     General appearance: uncomfortable cramping continues since luciano placement     S/p Luciano uterine balloon placement at 1120    CONTRACTIONS: occasional  FETAL HEART TONES: continuous EFM- baseline 150 with moderate variability and positive accelerations. No decelerations.  ROM: not ruptured  PELVIC EXAM: deferred. Luciano uterine balloon in place  PAIN: denies need for pain management options     ASSESSMENT:  ==============  IUP @ 40w0d for induction of labor.  Indication: fetal indication neural tube defect     Fetal Heart Rate Tracing category one    GBS- positive, antibiotics not yet started     Patient Active Problem List   Diagnosis     Previous  delivery, antepartum condition or complication     Goiter     Neural tube defect of fetus affecting king pregnancy      (normal spontaneous vaginal delivery)     Herpes simplex vulvovaginitis     H. pylori infection     Encounter for induction of labor     PLAN:  ===========  Reviewed recommendation to start low dose Pitocin since she has not seen an increase in contractions since Luciano balloon placement.   Ambulation, hydration, position changes, birthing ball/sling and tub options to facilitate labor.  Continue cervical ripening with cervical luciano bulb and low dose Pitocin  Prophylactic IV antibiotic for positive GBS status reviewed with pt. Agreeable to PCN when she begins having regular contractions.   NICU at delivery and with planned admission   Anticipate     IVY Lawson CNM     ADDENDUM at 1840:   S: Pt feeling mild cramping.     O: " Aminta q3-5 mins, mild  Simmons balloon out at 1815.   SVE at 1820 was 4-5, 40%, -2.      A: IOL for fetal neural tube defect.     P: Continue to titrate Pitocin. Consider AROM when lower station.   Anticipate     IVY Lawson CNM

## 2021-08-12 NOTE — H&P
"ADMIT NOTE  =================  40w0d    Daria Jaffe is a 36 year old female with an Patient's last menstrual period was 2020. and Estimated Date of Delivery: Aug 12, 2021 [facility] at 10:23 AM with for induction of labor.  Indication: fetal indication neural tube defect.     HPI  ================  She reports feeling well today, just a little nervous. Reviewed symptoms of HSV, pt denies all.     Denies fever, cough, SOB or chest pain. Denies having contact with anyone who is Covid-19 positive. She completed a covid test with negative results on 8/10/21.     Contractions- not felt by patient  Fetal movement- active  ROM- no   Vaginal bleeding- none  GBS- positive  FOB- is involved, and at bedside   Other labor support- may have one other friend/family later     Weight gain- no prepregnancy weight listed, initiated care in the [facility]""  Height- 66\"  BMI- prepregnancy not known, current BMI is 32  First prenatal visit at in \[facility]\"" and had an early dating ultrasound. Then went to [facility] for 1 visit and transferred care to [facility] due to NTD. [place] visits- 8 in [place]    PROBLEM LIST  =================  Patient Active Problem List    Diagnosis Date Noted     Encounter for induction of labor 2021     Priority: Medium      (normal spontaneous vaginal delivery) 2021     Priority: Medium       - 3rd degree laceration, sulcus tears, and cervical laceration requiring general anesthesia for repair. Ongoing bleeding resulting in IR embolization of uterine and pudendal arteries. Received blood transfusions.   - Elective c/s  2012 -   2014 -   2017 -        Neural tube defect of fetus affecting king pregnancy 2021     Priority: Medium     S CNVANDA AKHTAR pt  Partner's name:   [ ] NOB folder  [x] Dating - LMP. Early US in [facility] - patient states was c/w LMP, but no outside records available.  [x] First tri screen - declined  [x] QS/AFP - " declined  [x] Fetal anatomy US - neural tube defect at S1-S2  [x] Rubella immune  [ ] Hep B immune/nonimmune  [ ] Pap  [ ] Started ASA   [x] NO plan utox in labor   _____________________________________  [ ] EOB folder  [ ] PP Contraception plan: If tubal,consent date:  [x] Labor plans: fentanyl if needed; continuous EFM, NICU for delivery with admission after birth  [ ] :  [x] Infant feeding plan: breast  [ ] FLU shot  [x] TDAP - declines  [x] Rhogam if needed, date: n/a  [x] TOLAC consent done - signed on 21  [x] Waterbirth: n/a  [x] GCT - declines. Fasting BG of 74 and A1c of 5.4 on 21  ________________________________________  [ ] OTC PP meds sent  [ ] Planning CS-ERAS pkt         H. pylori infection 2017     Priority: Medium     Adequately treated.        Goiter 2015     Priority: Medium     Previous  delivery, antepartum condition or complication 2014     Priority: Medium     Herpes simplex vulvovaginitis      Priority: Medium     HSV type 1 cultured from vaginal lesions.         HISTORIES  ============  No Known Allergies  Past Medical History:   Diagnosis Date     Anemia     needed 2 units of blood for PPH     Herpes simplex without mention of complication     last outbreak in 2012     History of blood transfusion      Thyroid disease     thyroid nodule, normal TSH     Past Surgical History:   Procedure Laterality Date     c/section[  second baby     COLONOSCOPY N/A 2016    Procedure: COLONOSCOPY;  Surgeon: Danni Andino MD;  Location:  GI     emobolization of pudendal and uterine arteries[       ESOPHAGOSCOPY, GASTROSCOPY, DUODENOSCOPY (EGD), COMBINED N/A 2016    Procedure: COMBINED ESOPHAGOSCOPY, GASTROSCOPY, DUODENOSCOPY (EGD), BIOPSY SINGLE OR MULTIPLE;  Surgeon: Stephen Gong MD;  Location:  GI     HEAD & NECK SURGERY      ORAL SURGERY     THYROIDECTOMY Right 2015    Procedure: THYROIDECTOMY;  Surgeon: Hasmukh  Ankush Cheney MD;  Location:  OR   .  Family History   Problem Relation Age of Onset     Colon Cancer No family hx of      Social History     Tobacco Use     Smoking status: Never Smoker     Smokeless tobacco: Never Used   Substance Use Topics     Alcohol use: No     OB History    Para Term  AB Living   6 5 5 0 0 5   SAB TAB Ectopic Multiple Live Births   0 0 0 0 5      # Outcome Date GA Lbr Richard/2nd Weight Sex Delivery Anes PTL Lv   6 Current            5 Term 17 42w1d 03:28 / 00:05 3.82 kg (8 lb 6.8 oz) F  Local, IV N FRANKI      Name: KEVIN CALL1 BEAR      Apgar1: 9  Apgar5: 9   4 Term 14 41w2d 03:08 / 00:04 3.935 kg (8 lb 10.8 oz) M  None N FRANKI      Name: Muba      Apgar1: 8  Apgar5: 9   3 Term 08/10/12 40w1d 05:20 / 00:08 3.629 kg (8 lb) M  Local N FRANKI      Name: Cecilia Med      Apgar1: 9  Apgar5: 9   2 Term 11 37w0d    -SEC   FRANKI      Name: Ricrystal   1 Term 04/27/10 41w0d   F    FRANKI      Name: Barrington Basilio        LABS:   ===========  Prenatal Labs:  Rhogam not indicated   Lab Results   Component Value Date    ABO O 2018    RH Pos 2018    AS Negative 2021    HEPBANG Nonreactive 2017    TREPAB Negative 2017    RUBELLAABIGG immune 2014    HGB 11.0 (L) 2021    HIV neg 2012     Rubella Immune   Lab Results   Component Value Date    GBS pos 21     Other labs:  Results for orders placed or performed during the hospital encounter of 21 (from the past 24 hour(s))   ABO/Rh type and screen    Narrative    The following orders were created for panel order ABO/Rh type and screen.  Procedure                               Abnormality         Status                     ---------                               -----------         ------                     Adult Type and Screen[[id number]]                            Final result                 Please view results for these tests on the individual orders.   CBC with  "platelets differential    Narrative    The following orders were created for panel order CBC with platelets differential.  Procedure                               Abnormality         Status                     ---------                               -----------         ------                     CBC with platelets and d...[[id number]]  Abnormal            Final result                 Please view results for these tests on the individual orders.   Adult Type and Screen   Result Value Ref Range    ABO/RH(D) O POS     Antibody Screen Negative Negative    SPECIMEN EXPIRATION DATE [id number]    CBC with platelets and differential   Result Value Ref Range    WBC Count 7.0 4.0 - 11.0 10e3/uL    RBC Count 3.71 (L) 3.80 - 5.20 10e6/uL    Hemoglobin 11.0 (L) 11.7 - 15.7 g/dL    Hematocrit 33.2 (L) 35.0 - 47.0 %    MCV 90 78 - 100 fL    MCH 29.6 26.5 - 33.0 pg    MCHC 33.1 31.5 - 36.5 g/dL    RDW 15.8 (H) 10.0 - 15.0 %    Platelet Count 120 (L) 150 - 450 10e3/uL    % Neutrophils 60 %    % Lymphocytes 23 %    % Monocytes 14 %    % Eosinophils 2 %    % Basophils 0 %    % Immature Granulocytes 1 %    NRBCs per 100 WBC 0 <1 /100    Absolute Neutrophils 4.2 1.6 - 8.3 10e3/uL    Absolute Lymphocytes 1.6 0.8 - 5.3 10e3/uL    Absolute Monocytes 1.0 0.0 - 1.3 10e3/uL    Absolute Eosinophils 0.1 0.0 - 0.7 10e3/uL    Absolute Basophils 0.0 0.0 - 0.2 10e3/uL    Absolute Immature Granulocytes 0.1 (H) <=0.0 10e3/uL    Absolute NRBCs 0.0 10e3/uL       ROS  =========  Pt denies significant respiratory, cardiovacular, GI, or muscular/skeletalcomplaints.    See RN data base ROS.     PHYSICAL EXAM:  ===============  /74   Temp 98.3  F (36.8  C) (Oral)   Resp 18   Ht 1.676 m (5' 6\")   Wt 90.7 kg (200 lb)   LMP 11/05/2020   BMI 32.28 kg/m    General appearance: comfortable  GENERAL APPEARANCE: healthy, alert and no distress  RESP: lungs clear to auscultation - no rales, rhonchi or wheezes  CV: regular rates and rhythm, normal S1 S2, " no S3 or S4 and no murmur,and no varicosities  ABDOMEN:  soft, nontender, no epigastric pain  SKIN: no suspicious lesions or rashes  NEURO: Denies headache, blurred vision, other vision changes  PSYCH: mentation appears normal. and affect normal/bright  Legs: No clonus bilaterally and No edema     Abdomen: gravid, vertex fetus per Leopold's, non-tender between contractions.   Cephalic presentation confirmed by transverse abdominal BSUS  EFW-  8 lbs.   CONTACTIONS: every 10-20 minutes  FETAL HEART TONES: continuous EFM- baseline 140 with moderate variability and positive accelerations. No decelerations.  PELVIC EXAM: 1/ 30%/ Posterior/ soft/ -3. Bright light exam negative.    BO SCORE: 4  BLOODY SHOW: no   ROM:no  FLUID: none  ROMPlus: not done    ASSESSMENT:  ==============  IUP @ 40w0d admitted for induction of labor.  Indication: fetal indication - neural tube defect    NST REACTIVE  Fetal Heart Rate - category one  GBS- positive- not treated until active labor or regular uncomfortable contractions     Patient Active Problem List   Diagnosis     Previous  delivery, antepartum condition or complication     Goiter     Neural tube defect of fetus affecting king pregnancy      (normal spontaneous vaginal delivery)     Herpes simplex vulvovaginitis     H. pylori infection     Encounter for induction of labor        PLAN:  ===========  Admit - see IP orders.   Cervical ripening with cervical luciano bulb risks and benefits reviewed with pt. Agreeable to plan.Observation and reevaluate in 1-2 hours and consider Pitocin.   Pitocin risks and benefits reviewed with pt. She is agreeable to starting this as needed for labor induction.   Prophylactic IV antibiotic for positive GBS status reviewed with pt. Agreeable to PCN when having regular painful contractions or with ROM.   Pt aware to report any symptoms of HSV.   Reviewed plan NICU at delivery and then NICU admission for fetal NTD.   Ambulation,  hydration, position changes, birthing ball and tub options to facilitate labor reviewed with pt.  Anticipate     IVY Lawson CNM    =============

## 2021-08-12 NOTE — PLAN OF CARE
Induction of Labor Admit Note  Daria Jaffe  MRN: [id number]  Gestational Age: 40w0d      Daria Jaffe presents for induction of labor for PD, TOLAC.  Patient denies contractions, bleeding or ROM.    Past Medical History:   Diagnosis Date     Anemia     needed 2 units of blood for PPH     Herpes simplex without mention of complication     last outbreak in 2012     History of blood transfusion      Thyroid disease     thyroid nodule, normal TSH     OB Induction Plan: Completed and available in epic chart.    LALA Augustin CNM notified of patient's arrival and condition.   Oriented patient to surroundings. Call light within reach.     Plan:   -luciano balloon and pitocin

## 2021-08-12 NOTE — PLAN OF CARE
Pt alert, active, and stable. No signs or symptoms of distress. VSS. Simmons bulb placed at 1120 and is now out. See flowsheet for documentation. Pit running and nurse increasing slowly as needed. Denies loss of fluid, bleeding, and pain. See flow sheet for FHR interpretation. No questions or concerns at this time. Will continue to monitor pt closely.

## 2021-08-13 PROBLEM — O34.219 VBAC, DELIVERED: Status: ACTIVE | Noted: 2021-06-30

## 2021-08-13 PROCEDURE — 120N000002 HC R&B MED SURG/OB UMMC

## 2021-08-13 PROCEDURE — 250N000011 HC RX IP 250 OP 636: Performed by: MIDWIFE

## 2021-08-13 PROCEDURE — 250N000009 HC RX 250: Performed by: MIDWIFE

## 2021-08-13 PROCEDURE — 250N000013 HC RX MED GY IP 250 OP 250 PS 637: Performed by: ADVANCED PRACTICE MIDWIFE

## 2021-08-13 PROCEDURE — 258N000003 HC RX IP 258 OP 636: Performed by: MIDWIFE

## 2021-08-13 PROCEDURE — 722N000001 HC LABOR CARE VAGINAL DELIVERY SINGLE

## 2021-08-13 PROCEDURE — 0KQM0ZZ REPAIR PERINEUM MUSCLE, OPEN APPROACH: ICD-10-PCS | Performed by: ADVANCED PRACTICE MIDWIFE

## 2021-08-13 PROCEDURE — 88307 TISSUE EXAM BY PATHOLOGIST: CPT | Mod: TC | Performed by: ADVANCED PRACTICE MIDWIFE

## 2021-08-13 RX ORDER — IBUPROFEN 600 MG/1
600 TABLET, FILM COATED ORAL EVERY 6 HOURS PRN
Qty: 60 TABLET | Refills: 0 | Status: SHIPPED | OUTPATIENT
Start: 2021-08-13 | End: 2021-09-12

## 2021-08-13 RX ORDER — MISOPROSTOL 200 UG/1
800 TABLET ORAL
Status: DISCONTINUED | OUTPATIENT
Start: 2021-08-13 | End: 2021-08-14 | Stop reason: HOSPADM

## 2021-08-13 RX ORDER — OXYTOCIN 10 [USP'U]/ML
10 INJECTION, SOLUTION INTRAMUSCULAR; INTRAVENOUS
Status: DISCONTINUED | OUTPATIENT
Start: 2021-08-13 | End: 2021-08-14 | Stop reason: HOSPADM

## 2021-08-13 RX ORDER — BISACODYL 10 MG
10 SUPPOSITORY, RECTAL RECTAL DAILY PRN
Status: DISCONTINUED | OUTPATIENT
Start: 2021-08-13 | End: 2021-08-14 | Stop reason: HOSPADM

## 2021-08-13 RX ORDER — TRANEXAMIC ACID 10 MG/ML
1 INJECTION, SOLUTION INTRAVENOUS EVERY 30 MIN PRN
Status: DISCONTINUED | OUTPATIENT
Start: 2021-08-13 | End: 2021-08-14 | Stop reason: HOSPADM

## 2021-08-13 RX ORDER — SODIUM CHLORIDE, SODIUM LACTATE, POTASSIUM CHLORIDE, CALCIUM CHLORIDE 600; 310; 30; 20 MG/100ML; MG/100ML; MG/100ML; MG/100ML
INJECTION, SOLUTION INTRAVENOUS
Status: DISCONTINUED
Start: 2021-08-13 | End: 2021-08-13 | Stop reason: HOSPADM

## 2021-08-13 RX ORDER — IBUPROFEN 800 MG/1
800 TABLET, FILM COATED ORAL EVERY 6 HOURS PRN
Status: DISCONTINUED | OUTPATIENT
Start: 2021-08-13 | End: 2021-08-14 | Stop reason: HOSPADM

## 2021-08-13 RX ORDER — HYDROXYZINE HYDROCHLORIDE 25 MG/1
50 TABLET, FILM COATED ORAL EVERY 6 HOURS PRN
Status: DISCONTINUED | OUTPATIENT
Start: 2021-08-13 | End: 2021-08-14 | Stop reason: HOSPADM

## 2021-08-13 RX ORDER — AMOXICILLIN 250 MG
1 CAPSULE ORAL DAILY
Qty: 30 TABLET | Refills: 0 | Status: SHIPPED | OUTPATIENT
Start: 2021-08-13 | End: 2021-09-12

## 2021-08-13 RX ORDER — ACETAMINOPHEN 325 MG/1
650 TABLET ORAL EVERY 4 HOURS PRN
Status: DISCONTINUED | OUTPATIENT
Start: 2021-08-13 | End: 2021-08-14 | Stop reason: HOSPADM

## 2021-08-13 RX ORDER — OXYTOCIN/0.9 % SODIUM CHLORIDE 30/500 ML
340 PLASTIC BAG, INJECTION (ML) INTRAVENOUS CONTINUOUS PRN
Status: DISCONTINUED | OUTPATIENT
Start: 2021-08-13 | End: 2021-08-14 | Stop reason: HOSPADM

## 2021-08-13 RX ORDER — SENNOSIDES 8.6 MG
8.6 TABLET ORAL 2 TIMES DAILY PRN
Status: DISCONTINUED | OUTPATIENT
Start: 2021-08-13 | End: 2021-08-14 | Stop reason: HOSPADM

## 2021-08-13 RX ORDER — CARBOPROST TROMETHAMINE 250 UG/ML
250 INJECTION, SOLUTION INTRAMUSCULAR
Status: DISCONTINUED | OUTPATIENT
Start: 2021-08-13 | End: 2021-08-14 | Stop reason: HOSPADM

## 2021-08-13 RX ORDER — METHYLERGONOVINE MALEATE 0.2 MG/ML
200 INJECTION INTRAVENOUS
Status: DISCONTINUED | OUTPATIENT
Start: 2021-08-13 | End: 2021-08-14 | Stop reason: HOSPADM

## 2021-08-13 RX ORDER — ACETAMINOPHEN 325 MG/1
650 TABLET ORAL EVERY 6 HOURS PRN
Qty: 60 TABLET | Refills: 0 | Status: SHIPPED | OUTPATIENT
Start: 2021-08-13 | End: 2021-09-12

## 2021-08-13 RX ORDER — HYDROCORTISONE 2.5 %
CREAM (GRAM) TOPICAL 3 TIMES DAILY PRN
Status: DISCONTINUED | OUTPATIENT
Start: 2021-08-13 | End: 2021-08-14 | Stop reason: HOSPADM

## 2021-08-13 RX ORDER — MODIFIED LANOLIN
OINTMENT (GRAM) TOPICAL
Status: DISCONTINUED | OUTPATIENT
Start: 2021-08-13 | End: 2021-08-14 | Stop reason: HOSPADM

## 2021-08-13 RX ORDER — MISOPROSTOL 200 UG/1
400 TABLET ORAL
Status: DISCONTINUED | OUTPATIENT
Start: 2021-08-13 | End: 2021-08-14 | Stop reason: HOSPADM

## 2021-08-13 RX ADMIN — FENTANYL CITRATE 100 MCG: 50 INJECTION INTRAMUSCULAR; INTRAVENOUS at 01:41

## 2021-08-13 RX ADMIN — OMEPRAZOLE 20 MG: 20 CAPSULE, DELAYED RELEASE ORAL at 10:01

## 2021-08-13 RX ADMIN — ACETAMINOPHEN 650 MG: 325 TABLET, FILM COATED ORAL at 15:28

## 2021-08-13 RX ADMIN — IBUPROFEN 800 MG: 800 TABLET ORAL at 03:15

## 2021-08-13 RX ADMIN — IBUPROFEN 800 MG: 800 TABLET ORAL at 22:31

## 2021-08-13 RX ADMIN — BENZOCAINE AND LEVOMENTHOL: 200; 5 SPRAY TOPICAL at 04:54

## 2021-08-13 RX ADMIN — IBUPROFEN 800 MG: 800 TABLET ORAL at 10:01

## 2021-08-13 RX ADMIN — HYDROXYZINE HYDROCHLORIDE 50 MG: 25 TABLET, FILM COATED ORAL at 22:37

## 2021-08-13 RX ADMIN — LIDOCAINE HYDROCHLORIDE 20 ML: 10 INJECTION, SOLUTION EPIDURAL; INFILTRATION; INTRACAUDAL; PERINEURAL at 02:44

## 2021-08-13 RX ADMIN — FENTANYL CITRATE 100 MCG: 50 INJECTION INTRAMUSCULAR; INTRAVENOUS at 00:42

## 2021-08-13 RX ADMIN — SENNOSIDES 8.6 MG: 8.6 TABLET, FILM COATED ORAL at 19:33

## 2021-08-13 RX ADMIN — SODIUM CHLORIDE, POTASSIUM CHLORIDE, SODIUM LACTATE AND CALCIUM CHLORIDE 1000 ML: 600; 310; 30; 20 INJECTION, SOLUTION INTRAVENOUS at 02:22

## 2021-08-13 RX ADMIN — SODIUM CHLORIDE 3 MILLION UNITS: 9 INJECTION, SOLUTION INTRAVENOUS at 02:26

## 2021-08-13 NOTE — PROGRESS NOTES
"Labor progress note    S:  Patient feeling mild contractions.  Eager for labor to progress.    O:  Blood pressure 122/71, temperature 98.2  F (36.8  C), temperature source Oral, resp. rate 20, height 1.676 m (5' 6\"), weight 90.7 kg (200 lb), last menstrual period 2020, unknown if currently breastfeeding.  General appearance: comfortable.  Contractions: Every 2-5 minutes. 60-90 seconds duration.  Palpate: mild.  FHT: Baseline 130 with moderate variability. Accelerations are present. No decelerations present.  ROM: not ruptured.   Pelvic exam: deferred  Pitocin- 6 mu/min.,  Antibiotics- PCN      A:  IUP @ 40w0d IOL for fetal ONTD   Fetal Heart rate tracing Category one  GBS- negative  Patient Active Problem List   Diagnosis     Previous  delivery, antepartum condition or complication     Goiter     Neural tube defect of fetus affecting king pregnancy      (normal spontaneous vaginal delivery)     Herpes simplex vulvovaginitis     H. pylori infection     Encounter for induction of labor         P:  Anticipate   Labor induction with Pitocin  reevaluate in 2-4 hours/PRN   Patient will consider IV pain medication if needed, aware to ask if she desires medication.  IVY Benavides CNM  "

## 2021-08-13 NOTE — PLAN OF CARE
Data: VSS, postpartum assessments WNL. She is voiding without difficulty, up ad roberth, passing gas, eating and drinking normally. Perineum appears to be healing well, lochia WNL, no s/s infection. Has not wanted to pump today yet, pumping supplies are in her room. Taking ibuprofen for pain. Reports good pain management.   Action: Education provided on discharge goals, plan of care.   Response: Pt is agreeable with her plan of care. Has been to visit infant in NICU. Will continue with POC.

## 2021-08-13 NOTE — PLAN OF CARE
PT. UP TO VOID, WITH STAND BY ASSIST.  GAIT STEADY, DENIES DIZZINESS.  PT. ABLE TO VOID MODERATE AMT, WITHOUT DIFFICULTY.  PERICARE COMPLETED PER PT.  NEW GOWN, PERIPAD, ET PANTIES APPLIED.  PT. TO WHEELCHAIR.  PT. TO SEE INFANT, IN NICU.

## 2021-08-13 NOTE — L&D DELIVERY NOTE
Delivery Summary    Daria Jaffe MRN# [id number]   Age: 36 year old YOB: 1985     ASSESSMENT & PLAN:   DELIVERY NOTE:  Brief Labor Course: Patient presented to labor and delivery for induction of labor due to fetal spinal defect.  Simmons balloon placed in cervix and Pitocin started.  Simmons spontaneously expelled and Pitocin induction continued.  Patient consented to amniotomy, large amount of thick meconium stained fluid noted.  Patient requested and received IV pain medication, requested a labor epidural, but quickly went from 6cm to complete.    Delivery Note:   NICU called for delivery when patient noted to be complete, patient delivered a vigorous female infant with loose nuchal cord, not reduced prior to delivery over one contraction.  Infant delivered MOA, restituted LOT, anterior shoulder delivered with maternal effort and gentle downward traction.  Cord clamped and cut, infant passed off to NICU team.  Cord gases obtained.  Placenta delivered spontaneously intact via Schultze mechanism.  IV Pitocin was infusing prior to delivery of placenta.  Perineum inspected, shallow 2nd degree laceration identified and repaired under local anesthetic using 3-0 Vicryl in the usual fashion.  Infant taken to NICU, mother stable in recovery.  Uterus firm.    IUP at 40 weeks gestation delivered on 2021.     delivery of a viable Female infant.  Weight : 3460gms 8 pounds 0 oz  Apgars of 9 at 1 minute and 9 at 5 minutes.  Labor was induced.  Medications administered  in labor:  Pain Rx Fentanyl; Antibiotics Yes: PCN and IV antibiotics infused greater than 4 hours;   Perineum: 2nd degree  Placenta-mechanism: spontaneous, intact,  with a 3 vessel cord. IV oxytocin was given After delivery of baby Before delivery of placenta  Quantitative Blood Loss was 407cc..  Complications of labor and delivery: None  Anticipated Discharge Date: 08/15/2021  Birth attendants: ROMINA Armstrong, APRN  ROMINA       Ld, Female-Daria [[id number]]    Labor Event Times    Labor onset date: 21 Onset time: 10:30 PM   Dilation complete date: 21 Complete time:  2:35 AM   Start pushing date/time: 2021 0239      Labor Events     labor?: No   steroids: None  Labor Type: TOLAC (Trial of Labor After ), Induction/Cervical ripening  Predominate monitoring during 1st stage: continuous electronic fetal monitoring     Antibiotics received during labor?: Yes  Reason for Antibiotics: GBS  Antibiotics received for GBS: Penicillin     Rupture identifier: Sac 1  Rupture date/time: 21   Rupture type: Artificial Rupture of Membranes  Fluid color: Meconium  Fluid odor: Normal     Induction: Mechanical ripening agent, Oxytocin  Induction date/time: 21   Cervical ripening date/time: 21      Augmentation: None  1:1 continuous labor support provided by?: RN Labor partogram used?: no      Delivery/Placenta Date and Time    Delivery Date: 21 Delivery Time:  2:39 AM    Other personnel present at delivery:  Provider Role   Marlene Ramos, RN Delivery Nurse         Vaginal Counts          Needles Suture Needles Sponges (RETIRED) Instruments   Initial counts       Added to count       Relief counts       Final counts             Placed during labor Accounted for at the end of labor   FSE     IUPC     Cervadil No NA                     Apgars    Living status: Living   1 Minute 5 Minute 10 Minute 15 Minute 20 Minute   Skin color: 1  1       Heart rate: 2  2       Reflex irritability: 2  2       Muscle tone: 2  2       Respiratory effort: 2  2       Total: 9  9       Apgars assigned by: DESTINY APRN     Cord    Vessels: 3 Vessels    Cord Complications: Nuchal   Nuchal Intervention: delivered through         Nuchal cord description: loose nuchal cord         Stem cell collection?: No       Skin to Skin and Feeding Plan    Skin to skin initiation date/time:     Skin to skin with:  Mother  Skin to skin end date/time:        Labor Events and Shoulder Dystocia    Fetal Tracing Prior to Delivery: Category 1     Delivery (Maternal) (Provider to Complete) ([id number])    Episiotomy: None  Perineal lacerations: 2nd    Repair suture: 3-0 Vicryl  Number of repair packets: 1  Genital tract inspection done: Pos     Blood Loss  Mother: Daria Jaffe N #[id number]   Start of Mother's Information    Delivery Blood Loss  08/12/21 2230 - 08/13/21 0258    None           End of Mother's Information  Mother: Daria Jaffe N #[id number]          Delivery - Provider to Complete (552020)    CNM Care: Exclusive CNM care in labor  Attempted Delivery Types (Choose all that apply): Spontaneous Vaginal Delivery  Delivery Type (Choose the 1 that will go to the Birth History): Vaginal, Spontaneous                   Other personnel:  Provider Role   Marlene Ramos, RN Delivery Nurse                Placenta    Removal: Spontaneous  Comments: Intact via Schultze mechanism with 3 vessel cord  Disposition: Pathology           Anesthesia    Method: INTRAVENOUS                        IVY Benavides CNM

## 2021-08-13 NOTE — PROGRESS NOTES
"Labor progress note    S:  Patient much more uncomfortable with contractions.  Requesting another dose of Fentanyl at this time.  Consents to cervical exam prior to next dose of medication.    O:  Blood pressure 121/76, temperature 98.4  F (36.9  C), temperature source Oral, resp. rate 16, height 1.676 m (5' 6\"), weight 90.7 kg (200 lb), last menstrual period 2020, unknown if currently breastfeeding.  General appearance: uncomfortable with contractions.  Contractions: Every 2-3 minutes. 60-90 seconds duration.  Palpate: moderate and strong.  FHT: Baseline 135 with moderate variability. Accelerations are present. No decelerations present.  ROM: thick meconium fluid. Membranes have been ruptured for 2 hours.  Pelvic exam: 6/ 50%/ Mid/ soft/ -1    Pitocin- 9 mu/min.,  Antibiotics- PCNx2 doses    A:  IUP @ 40w1d IOL for Fetal OND   Fetal Heart rate tracing Category one  GBS- positive  Patient Active Problem List   Diagnosis     Previous  delivery, antepartum condition or complication     Goiter     Neural tube defect of fetus affecting king pregnancy      (normal spontaneous vaginal delivery)     Herpes simplex vulvovaginitis     H. pylori infection     Encounter for induction of labor         P:  Pain medication IV Fentanyl at patient request  Anticipate   Labor induction with Pitocin  reevaluate in 2-4 hours/PRN   Plan NICU at delivery.  Susie Contreras, IVY ESCOBARM  "

## 2021-08-13 NOTE — PROGRESS NOTES
"Labor progress note    S:  Patient starting to feel more uncomfortable with contractions.  Consents to cervical exam at this time and would be open to amniotomy if she has made cervical change.    O:  Blood pressure 128/77, temperature 98.2  F (36.8  C), temperature source Oral, resp. rate 16, height 1.676 m (5' 6\"), weight 90.7 kg (200 lb), last menstrual period 2020, unknown if currently breastfeeding.  General appearance: uncomfortable .  Contractions: Every 3-6 minutes.  seconds duration.  Palpate: moderate.  FHT: Baseline 140 with moderate variability. Accelerations are present. No decelerations present.  ROM: amniotomy performed with patient consent.  Thick meconium stained fluid noted.  Pelvic exam: 6/ 50%/ Posterior/ average/ -2, well applied.    Pitocin- 9 mu/min.,  Antibiotics- PCN, second dose now infusing.      A:  IUP @ 40w0d IOL for fetal ONTD   Fetal Heart rate tracing Category one  GBS- positive  Patient Active Problem List   Diagnosis     Previous  delivery, antepartum condition or complication     Goiter     Neural tube defect of fetus affecting king pregnancy      (normal spontaneous vaginal delivery)     Herpes simplex vulvovaginitis     H. pylori infection     Encounter for induction of labor         P:  Anticipate   Labor induction with Pitocin  reevaluate in 2-4 hours/PRN   NICU updated on meconium stained fluid, and will plan to be be present for delivery due to fetal spinal defect.  IVY Benavides CNM  "

## 2021-08-13 NOTE — PLAN OF CARE
[id number]:  VSS and postpartum assessments WDL.  Up ad roberth with steady gait and independent with cares.  Pain managed with tylenol and ibuprofen per MAR.  , Daria asleep on couch.  Pt resting and awaiting news from NICU when infant is out of surgery and then the doctors will come to talk with her about how it went.  Will continue with postpartum cares and education per plan of care.

## 2021-08-13 NOTE — PLAN OF CARE
Data: Daria Jaffe transferred to wheelchair via wheelchair at 0720.   Action: Receiving unit notified of transfer: Yes. Patient and family notified of room change.  Belongings sent to receiving unit. Accompanied by Registered Nurse. Oriented patient to surroundings. Call light within reach.   Response: Patient tolerated transfer and is stable.

## 2021-08-13 NOTE — PLAN OF CARE
Patient arrived to [facility] unit via wheelchair at 0720,with belongings, accompanied by spouse/ significant other, with infant in NICU. Received report from crystal MARKS.Unit and room orientation completd. Call light given; no concerns present at this time. Continue with plan of care.

## 2021-08-13 NOTE — PROVIDER NOTIFICATION
08/13/21 1146   Provider Notification   Provider Name/Title Randi VANEGAS   Method of Notification Electronic Page   Request Evaluate-Remote   Notification Reason Medication Request  (Med to help her sleep tonight)

## 2021-08-14 VITALS
DIASTOLIC BLOOD PRESSURE: 76 MMHG | HEIGHT: 66 IN | RESPIRATION RATE: 16 BRPM | BODY MASS INDEX: 32.14 KG/M2 | TEMPERATURE: 98.4 F | HEART RATE: 80 BPM | WEIGHT: 200 LBS | SYSTOLIC BLOOD PRESSURE: 120 MMHG

## 2021-08-14 LAB — HGB BLD-MCNC: 10.7 G/DL (ref 11.7–15.7)

## 2021-08-14 PROCEDURE — 36415 COLL VENOUS BLD VENIPUNCTURE: CPT | Performed by: ADVANCED PRACTICE MIDWIFE

## 2021-08-14 PROCEDURE — 250N000013 HC RX MED GY IP 250 OP 250 PS 637: Performed by: ADVANCED PRACTICE MIDWIFE

## 2021-08-14 PROCEDURE — 85018 HEMOGLOBIN: CPT | Performed by: ADVANCED PRACTICE MIDWIFE

## 2021-08-14 RX ORDER — DOCUSATE SODIUM 100 MG/1
100 CAPSULE, LIQUID FILLED ORAL 2 TIMES DAILY PRN
Status: DISCONTINUED | OUTPATIENT
Start: 2021-08-14 | End: 2021-08-14 | Stop reason: HOSPADM

## 2021-08-14 RX ADMIN — IBUPROFEN 800 MG: 800 TABLET ORAL at 15:22

## 2021-08-14 RX ADMIN — IBUPROFEN 800 MG: 800 TABLET ORAL at 04:50

## 2021-08-14 RX ADMIN — ACETAMINOPHEN 650 MG: 325 TABLET, FILM COATED ORAL at 08:20

## 2021-08-14 RX ADMIN — OMEPRAZOLE 20 MG: 20 CAPSULE, DELAYED RELEASE ORAL at 08:20

## 2021-08-14 RX ADMIN — DOCUSATE SODIUM 100 MG: 100 CAPSULE, LIQUID FILLED ORAL at 15:22

## 2021-08-14 NOTE — LACTATION NOTE
"This note was copied from a baby's chart.  D:  I met with Daria; Derrell is her 6th baby; she nursed her other children 1.5-2 years each but has never needed to pump before.  She is normally in good health, takes no medications, and has no history of breast/chest surgery or trauma.  She has already started to pump.   I:  I gave her a folder of introductory materials and went over pumping guidelines.  I reviewed physiology of colostrum and milk production, pumping guidelines, and I gave her a log and encouraged her to use it.   I explained how to access the videos \"Hand Expression\" and \"Maximizing Milk Production\"; as well as other helpful books and websites.   We discussed hands-on pumping techniques and usefulness of a hands-free pumping bra.  We discussed skin to skin holding and how to reach your breastfeeding goals.  We talked about medications during breastfeeding.  She verbalized understanding via teachback.  I helped her with pumping and hand expression and she got a sizeable puddle which she was excited about.  Her other children are all in [facility] and she is anxious/ ready to room in and start nursing Derrell MONTOYA.   A:  Mom has information she needs to initiate her supply.   P:  Will continue to provide lactation support.    Sandra Sevilla, RNC, IBCLC            "

## 2021-08-14 NOTE — PROGRESS NOTES
HARSHAD responded to a consult request for this family. HARSHAD met with Daria (mother) to inquire about coping after baby's surgery yesterday and inquired about additional support/resources needed. Daria reports that she will be discharged today and inquired about a boarding room to stay with baby. HARSHAD stated that they are unaware of availability for rooming with baby and that would be something she could inquire with NICU nursing staff about. SW stated they can be paged for problem solving/support around that, however they aren't able to authorize a boarding room and don't have information about availability. HARSHAD stated that if she is unable to board with baby, there are no restrictions on how much time she can visit. HARSHAD stated that because she lives close enough to the hospital, a hotel room wouldn't be an option and wouldn't likely be any closer for her. Daria reports that she has support at home and via extended family and does not have a need for additional supports at this time. Daria did inquire about the process for applying for WIC and HARSHAD gave her the information for who she would need to contact. Daria doesn't report having additional needs at this time.    HARSHAD updated [facility] charge nurse and encouraged them to reach out with additional questions/concerns.    MINDA [facility]  Pediatric On-Call

## 2021-08-14 NOTE — PLAN OF CARE
Data: Vital signs within normal limits. Postpartum checks within normal limits - see flow record. Patient eating and drinking normally. Patient able to empty bladder independently and is up ambulating. No apparent signs of infection. Patient performing self cares.  Action: Patient taking ibuprofen for pain and stated she was comfortable. Patient education done about importance of emptying bladder. See flow record.   Support person present.

## 2021-08-14 NOTE — PLAN OF CARE
VSS, assessment WDL. Pump given. Discharge meds reviewed, pt will  from preferred pharmacy. Discharge instructions reviewed, no questions or concerns at this time. Baby staying in NICU. Pt discharged home at this time.

## 2021-08-14 NOTE — PLAN OF CARE
[id number]: VSS and postpartum assessments WDL. Ambulating with steady gait. Voiding without difficulty. Pain managed with Ibuprofen. Pt denies need for other pain medication. Hydroxyzine given for sleep. Pt visited infant in NICU. Partner present and supportive. Will continue with postpartum cares and education per plan of care.

## 2021-08-14 NOTE — PROVIDER NOTIFICATION
08/14/21 0948   Provider Notification   Provider Name/Title SHEBA Kelley   Method of Notification Electronic Page   Request Evaluate-Remote   Notification Reason Medication Request  (stool softner)

## 2021-08-14 NOTE — DISCHARGE SUMMARY
Post Partum Note and Discharge Summary  SIGNIFICANT PROBLEMS:  Patient Active Problem List    Diagnosis Date Noted     Encounter for induction of labor 2021     Priority: Medium     , delivered 2021     Priority: Medium       - 3rd degree laceration, sulcus tears, and cervical laceration requiring general anesthesia for repair. Ongoing bleeding resulting in IR embolization of uterine and pudendal arteries. Received blood transfusions.   - Elective c/s   -    -    -        Neural tube defect of fetus affecting king pregnancy 2021     Priority: Medium     [facility] ROMINA AKHTAR pt  Partner's name:   [ ] NOB folder  [x] Dating - LMP. Early US in [facility] - patient states was c/w LMP, but no outside records available.  [x] First tri screen - declined  [x] QS/AFP - declined  [x] Fetal anatomy US - neural tube defect at S1-S2  [x] Rubella immune  [ ] Hep B immune/nonimmune  [ ] Pap  [ ] Started ASA   [x] NO plan utox in labor   _____________________________________  [ ] EOB folder  [ ] PP Contraception plan: If tubal,consent date:  [x] Labor plans: fentanyl if needed; continuous EFM, NICU for delivery with admission after birth  [ ] :  [x] Infant feeding plan: breast  [ ] FLU shot  [x] TDAP - declines  [x] Rhogam if needed, date: n/a  [x] TOLAC consent done - signed on 21  [x] Waterbirth: n/a  [x] GCT - declines. Fasting BG of 74 and A1c of 5.4 on 21  ________________________________________  [ ] OTC PP meds sent  [ ] Planning CS-ERAS pkt         H. pylori infection 2017     Priority: Medium     Adequately treated.        Goiter 2015     Priority: Medium     Previous  delivery, antepartum condition or complication 2014     Priority: Medium     Herpes simplex vulvovaginitis      Priority: Medium     HSV type 1 cultured from vaginal lesions.         ADMISSION DIAGNOSIS:  Labor and Delivery   DISCHARGE DIAGNOSIS:     HOSPITAL  COURSE:  40w1d  Daria Jaffe is a 36 year old female     [facility]  9:03 AM  in for induction of labor.  Indication fetal spinal defect.   DELIVERY NOTE:  Brief Labor Course: Patient presented to labor and delivery for induction of labor due to fetal spinal defect. Planning TOLAC with hx of 3 successful  deliveries. Simmons balloon placed in cervix and Pitocin started.  Simmons spontaneously expelled and Pitocin induction continued.  Patient consented to amniotomy, large amount of thick meconium stained fluid noted.  Patient requested and received IV pain medication, requested a labor epidural, but quickly went from 6cm to complete.    Delivery Note:   NICU called for delivery when patient noted to be complete, patient delivered a vigorous female infant with loose nuchal cord, not reduced prior to delivery over one contraction.  Infant delivered MOA, restituted LOT, anterior shoulder delivered with maternal effort and gentle downward traction.  Cord clamped and cut, infant passed off to NICU team.  Cord gases obtained.  Placenta delivered spontaneously intact via Schultze mechanism.  IV Pitocin was infusing prior to delivery of placenta.  Perineum inspected, shallow 2nd degree laceration identified and repaired under local anesthetic using 3-0 Vicryl in the usual fashion.  Infant taken to NICU, mother stable in recovery.  Uterus firm.    IUP at 40 weeks gestation delivered on 2021.     delivery of a viable Female infant.  Weight : 3460gms 8 pounds 0 oz  Apgars of 9 at 1 minute and 9 at 5 minutes.  Labor was induced.  Medications administered  in labor:  Pain Rx Fentanyl; Antibiotics Yes: PCN and IV antibiotics infused greater than 4 hours;   Perineum: 2nd degree  Placenta-mechanism: spontaneous, intact,  with a 3 vessel cord. IV oxytocin was given After delivery of baby Before delivery of placenta  Quantitative Blood Loss was 407cc..  Complications of labor  "and delivery: None  Anticipated Discharge Date: 08/15/2021  Birth attendants: ROMINA Armstrong, IVY VANEGAS    INTERVAL HISTORY:  /65   Pulse 71   Temp 98.2  F (36.8  C) (Oral)   Resp 16   Ht 1.676 m (5' 6\")   Wt 90.7 kg (200 lb)   LMP 2020   Breastfeeding Unknown   BMI 32.28 kg/m    Pt stable, baby is in NICU. Baby doing well per pt. Pt has been down to visit a few times. Does not know plan for boarding in NICU. Partner here and supportive.  Breast feeding status:pumping for baby in NICU  # Discharge Pain Plan:   - During her hospitalization, Daria experienced pain due to .  The pain plan for discharge was discussed with Daria and her spouse and the plan was created in a collaborative fashion.    - tylenol, ibuprofen, tub baths    Complications since 2 hours post delivery: None  Patient is tolerating activity well, Voiding without difficulty, cramping is relieved by Ibuprophen, lochia is decreasing and patient denies clots.  Perineal pain is is relieved by Ibuprophen.  The perineum laceration is well approximated    Physical Exam:  General: Bright affect. Family supportive.   Breasts: soft, nontender, nipples intact, no cracking, redness or bruising. Pumping with electric pump  Abdomen: soft, nontender, fundus below U.   Lochia: small amount, rubra, no clots or odor.   Perineum: well-approximated.   Extremities: no edema, Tera's negative.     Postpartum hemoglobin   Hemoglobin   Date Value Ref Range Status   2021 10.7 (L) 11.7 - 15.7 g/dL Final   2018 10.1 (L) 11.7 - 15.7 g/dL Final      Prenatal Labs:   Lab Results   Component Value Date    ABO O 2018    RH Pos 2018    AS Negative 2021    HEPBANG Nonreactive 2017    TREPAB Negative 2017    RUQIGG 30 2017    HIV neg 2012     Rubella: immune  History of depression: none. Postpartum depression warning signs reviewed, encouraged one week follow up " visit..    ASSESSMENT/PLAN:  Normal postpartum exam , Stable Post-partum day #1  Complications:high risk for postpartum depression with baby in NICU. Plan 1 wk postpartum visit. Family supportive  Continue prenatal vitamins and high Fe foods  Plan d/c to boarder status today per NICU team. Home Visit Ordered- No  RTC 1 week and 6 weeks  Postpartum warning s/s reviewed, including bleeding/clots, fever, mastitis, or depression  Kegels/ crunches  Continue prenatal vitamins  Birthcontrol planned:Undecided. Fertility and contraception options reviewed.  PROCEDURES:      Current Discharge Medication List      START taking these medications    Details   senna-docusate (SENOKOT-S/PERICOLACE) 8.6-50 MG tablet Take 1 tablet by mouth daily Start after delivery.  Qty: 30 tablet, Refills: 0    Associated Diagnoses:  (normal spontaneous vaginal delivery)         CONTINUE these medications which have CHANGED    Details   acetaminophen (TYLENOL) 325 MG tablet Take 2 tablets (650 mg) by mouth every 6 hours as needed for mild pain Start after Delivery.  Qty: 60 tablet, Refills: 0    Associated Diagnoses:  (normal spontaneous vaginal delivery)      ibuprofen (ADVIL/MOTRIN) 600 MG tablet Take 1 tablet (600 mg) by mouth every 6 hours as needed for moderate pain Start after delivery  Qty: 60 tablet, Refills: 0    Associated Diagnoses:  (normal spontaneous vaginal delivery)         CONTINUE these medications which have NOT CHANGED    Details   Prenatal Vit-Fe Fumarate-FA (PRENATAL VITAMINS) 28-0.8 MG TABS Take 1 tablet by mouth daily  Qty: 90 tablet, Refills: 3    Associated Diagnoses: Supervision of high risk pregnancy in third trimester      omeprazole (PRILOSEC) 20 MG DR capsule Take 1 capsule (20 mg) by mouth daily  Qty: 60 capsule, Refills: 0    Associated Diagnoses: Heartburn during pregnancy in third trimester           IVY Coleman CNM

## 2021-09-29 LAB
PATH REPORT.COMMENTS IMP SPEC: NORMAL
PATH REPORT.COMMENTS IMP SPEC: NORMAL
PATH REPORT.FINAL DX SPEC: NORMAL
PATH REPORT.GROSS SPEC: NORMAL
PATH REPORT.MICROSCOPIC SPEC OTHER STN: NORMAL
PATH REPORT.RELEVANT HX SPEC: NORMAL
PHOTO IMAGE: NORMAL

## 2021-09-29 PROCEDURE — 88307 TISSUE EXAM BY PATHOLOGIST: CPT | Mod: 26 | Performed by: PATHOLOGY

## 2022-11-22 NOTE — TELEPHONE ENCOUNTER
Writer BRIANNA for pt to call back and schedule appt with Dr. Hopkins.    Please schedule a new, in person, visit with Dr. Hopkins on 7/13. If pt is not able to make this day please do next available    Jeirlyn Moulton   Neuro

## 2024-07-19 ENCOUNTER — LAB REQUISITION (OUTPATIENT)
Dept: LAB | Facility: CLINIC | Age: 39
End: 2024-07-19
Payer: COMMERCIAL

## 2024-07-19 DIAGNOSIS — Z12.4 ENCOUNTER FOR SCREENING FOR MALIGNANT NEOPLASM OF CERVIX: ICD-10-CM

## 2024-07-19 PROCEDURE — 87624 HPV HI-RISK TYP POOLED RSLT: CPT | Mod: ORL | Performed by: MIDWIFE

## 2024-07-19 PROCEDURE — G0145 SCR C/V CYTO,THINLAYER,RESCR: HCPCS | Mod: ORL | Performed by: MIDWIFE

## 2024-07-22 LAB
HPV HR 12 DNA CVX QL NAA+PROBE: NEGATIVE
HPV16 DNA CVX QL NAA+PROBE: NEGATIVE
HPV18 DNA CVX QL NAA+PROBE: NEGATIVE
HUMAN PAPILLOMA VIRUS FINAL DIAGNOSIS: NORMAL

## 2024-07-23 ENCOUNTER — HOSPITAL ENCOUNTER (OUTPATIENT)
Dept: MAMMOGRAPHY | Facility: CLINIC | Age: 39
Discharge: HOME | End: 2024-07-23
Attending: MIDWIFE
Payer: COMMERCIAL

## 2024-07-23 DIAGNOSIS — N64.4 MASTODYNIA: ICD-10-CM

## 2024-07-23 PROCEDURE — 77062 BREAST TOMOSYNTHESIS BI: CPT

## 2024-07-25 LAB
BKR LAB AP GYN ADEQUACY: NORMAL
BKR LAB AP GYN INTERPRETATION: NORMAL
BKR LAB AP LMP: NORMAL
BKR LAB AP PREVIOUS ABNL DX: NORMAL
BKR LAB AP PREVIOUS ABNORMAL: NORMAL
PATH REPORT.COMMENTS IMP SPEC: NORMAL
PATH REPORT.COMMENTS IMP SPEC: NORMAL
PATH REPORT.RELEVANT HX SPEC: NORMAL

## (undated) RX ORDER — CEFAZOLIN SODIUM 1 G/3ML
INJECTION, POWDER, FOR SOLUTION INTRAMUSCULAR; INTRAVENOUS
Status: DISPENSED
Start: 2021-08-12

## (undated) RX ORDER — PHENYLEPHRINE HCL IN 0.9% NACL 50MG/250ML
PLASTIC BAG, INJECTION (ML) INTRAVENOUS
Status: DISPENSED
Start: 2021-08-12

## (undated) RX ORDER — KETOROLAC TROMETHAMINE 30 MG/ML
INJECTION, SOLUTION INTRAMUSCULAR; INTRAVENOUS
Status: DISPENSED
Start: 2021-08-13

## (undated) RX ORDER — BUPIVACAINE HYDROCHLORIDE 2.5 MG/ML
INJECTION, SOLUTION EPIDURAL; INFILTRATION; INTRACAUDAL
Status: DISPENSED
Start: 2021-08-12

## (undated) RX ORDER — BUPIVACAINE HYDROCHLORIDE 2.5 MG/ML
INJECTION, SOLUTION EPIDURAL; INFILTRATION; INTRACAUDAL
Status: DISPENSED
Start: 2021-08-13

## (undated) RX ORDER — OXYTOCIN/0.9 % SODIUM CHLORIDE 30/500 ML
PLASTIC BAG, INJECTION (ML) INTRAVENOUS
Status: DISPENSED
Start: 2021-08-12

## (undated) RX ORDER — LIDOCAINE HYDROCHLORIDE 20 MG/ML
INJECTION, SOLUTION EPIDURAL; INFILTRATION; INTRACAUDAL; PERINEURAL
Status: DISPENSED
Start: 2021-08-12

## (undated) RX ORDER — TRANEXAMIC ACID 10 MG/ML
INJECTION, SOLUTION INTRAVENOUS
Status: DISPENSED
Start: 2021-08-13

## (undated) RX ORDER — ONDANSETRON 2 MG/ML
INJECTION INTRAMUSCULAR; INTRAVENOUS
Status: DISPENSED
Start: 2021-08-13

## (undated) RX ORDER — PROPOFOL 10 MG/ML
INJECTION, EMULSION INTRAVENOUS
Status: DISPENSED
Start: 2021-08-12

## (undated) RX ORDER — KETOROLAC TROMETHAMINE 30 MG/ML
INJECTION, SOLUTION INTRAMUSCULAR; INTRAVENOUS
Status: DISPENSED
Start: 2021-08-12

## (undated) RX ORDER — FENTANYL CITRATE-0.9 % NACL/PF 10 MCG/ML
PLASTIC BAG, INJECTION (ML) INTRAVENOUS
Status: DISPENSED
Start: 2021-08-12

## (undated) RX ORDER — LIDOCAINE HCL/EPINEPHRINE/PF 2%-1:200K
VIAL (ML) INJECTION
Status: DISPENSED
Start: 2021-08-12